# Patient Record
Sex: FEMALE
[De-identification: names, ages, dates, MRNs, and addresses within clinical notes are randomized per-mention and may not be internally consistent; named-entity substitution may affect disease eponyms.]

---

## 2022-05-03 PROBLEM — Z00.00 ENCOUNTER FOR PREVENTIVE HEALTH EXAMINATION: Status: ACTIVE | Noted: 2022-05-03

## 2022-05-04 ENCOUNTER — APPOINTMENT (OUTPATIENT)
Dept: OTOLARYNGOLOGY | Facility: CLINIC | Age: 57
End: 2022-05-04
Payer: COMMERCIAL

## 2022-05-04 VITALS
SYSTOLIC BLOOD PRESSURE: 101 MMHG | BODY MASS INDEX: 24.19 KG/M2 | DIASTOLIC BLOOD PRESSURE: 68 MMHG | HEART RATE: 71 BPM | WEIGHT: 120 LBS | HEIGHT: 59 IN

## 2022-05-04 DIAGNOSIS — R42 DIZZINESS AND GIDDINESS: ICD-10-CM

## 2022-05-04 DIAGNOSIS — Z82.2 FAMILY HISTORY OF DEAFNESS AND HEARING LOSS: ICD-10-CM

## 2022-05-04 DIAGNOSIS — Z78.9 OTHER SPECIFIED HEALTH STATUS: ICD-10-CM

## 2022-05-04 DIAGNOSIS — G43.009 MIGRAINE W/OUT AURA, NOT INTRACTABLE, W/OUT STATUS MIGRAINOSUS: ICD-10-CM

## 2022-05-04 DIAGNOSIS — Z80.9 FAMILY HISTORY OF MALIGNANT NEOPLASM, UNSPECIFIED: ICD-10-CM

## 2022-05-04 DIAGNOSIS — J34.2 DEVIATED NASAL SEPTUM: ICD-10-CM

## 2022-05-04 DIAGNOSIS — J31.0 CHRONIC RHINITIS: ICD-10-CM

## 2022-05-04 DIAGNOSIS — Z85.43 PERSONAL HISTORY OF MALIGNANT NEOPLASM OF OVARY: ICD-10-CM

## 2022-05-04 PROCEDURE — 92557 COMPREHENSIVE HEARING TEST: CPT

## 2022-05-04 PROCEDURE — 92570 ACOUSTIC IMMITANCE TESTING: CPT

## 2022-05-04 PROCEDURE — 92588 EVOKED AUDITORY TST COMPLETE: CPT

## 2022-05-04 PROCEDURE — 99203 OFFICE O/P NEW LOW 30 MIN: CPT

## 2022-05-04 RX ORDER — GINGER ROOT EXTRACT 50 MG
TABLET ORAL
Refills: 0 | Status: ACTIVE | COMMUNITY

## 2022-05-04 NOTE — ASSESSMENT
[FreeTextEntry1] : Reviewed and reconciled medications, allergies, PMHx, PSHx, SocHx, FMHx. \par \par h/o migraine\par vertigo\par \par Audio: type A tymps AU\par ETD AU\par hearing -8000 Hz AU\par right 100% discrim at 40db, left 96% discrim at 40db\par \par Plan:\par Audio - results interpreted by Dr. Dior and reviewed with the patient. VNG. FU after test.

## 2022-05-04 NOTE — REVIEW OF SYSTEMS
[Seasonal Allergies] : seasonal allergies [Vertigo] : vertigo [Lightheadedness] : lightheadedness [Nasal Congestion] : nasal congestion [Dry Eyes] : dry eyes [Negative] : Heme/Lymph [de-identified] : Headache [de-identified] : Feel cooler than others

## 2022-05-04 NOTE — PHYSICAL EXAM
[Midline] : trachea located in midline position [Normal] : no nystagmus [de-identified] : deviated septum midportion to left, along the floor on the right [de-identified] : class 2 [de-identified] : leukoplakia on cheeks. tip of uvula with edema [] : Romberg test is negative [FreeTextEntry2] : Sinuses nontender to percussion.  [de-identified] : PERRL [de-identified] : o [de-identified] : negative head roll- on Dramamine [de-identified] : on Dramamine

## 2022-05-04 NOTE — DATA REVIEWED
[de-identified] : type A tymps AU\par ETD AU\par hearing -8000 Hz AU\par 1) ENT F/U 2) re-eval/further testing as per MD

## 2022-05-04 NOTE — CONSULT LETTER
[Dear  ___] : Dear  [unfilled], [Consult Letter:] : I had the pleasure of evaluating your patient, [unfilled]. [Please see my note below.] : Please see my note below. [Consult Closing:] : Thank you very much for allowing me to participate in the care of this patient.  If you have any questions, please do not hesitate to contact me. [Sincerely,] : Sincerely, [FreeTextEntry3] : Srikanth Dior MD FACS

## 2022-05-04 NOTE — ADDENDUM
[FreeTextEntry1] : Documented by Brown Collins acting as scribe for Dr. Dior on 05/04/2022.\par \par All Medical record entries made by the Scribe were at my, Dr. Dior, direction and personally dictated by me on 05/04/2022. I have reviewed the chart and agree that the record accurately reflects my personal performance of the history, physical exam, assessment and plan. I have also personally directed, reviewed, and agreed with the discharge instructions.

## 2022-05-04 NOTE — HISTORY OF PRESENT ILLNESS
[de-identified] : The patient presents with h/o migraine. Pt presents today for initial consultation for vertigo. Pt details that her symptoms began last year suddenly after waking up with nystagmus. It had been intermittent, when most recently she woke up 5 day ago with vertigo when turning head to the right, which as improved with Dramamine. Pt took Dramamine prior to coming in for today's visit. Denies stop and go type injury, trauma, viral illness preceding symptoms. Denies tinnitus, changes in hearing. Pt does not get the migraine headache with the dizziness. Denies any other symptoms other than headache when migraines occur.

## 2022-07-13 ENCOUNTER — APPOINTMENT (OUTPATIENT)
Dept: OTOLARYNGOLOGY | Facility: CLINIC | Age: 57
End: 2022-07-13

## 2024-09-04 ENCOUNTER — EMERGENCY (EMERGENCY)
Facility: HOSPITAL | Age: 59
LOS: 1 days | Discharge: HOME | End: 2024-09-04
Attending: STUDENT IN AN ORGANIZED HEALTH CARE EDUCATION/TRAINING PROGRAM | Admitting: STUDENT IN AN ORGANIZED HEALTH CARE EDUCATION/TRAINING PROGRAM
Payer: COMMERCIAL

## 2024-09-04 VITALS
HEIGHT: 59 IN | WEIGHT: 113.1 LBS | OXYGEN SATURATION: 97 % | RESPIRATION RATE: 18 BRPM | SYSTOLIC BLOOD PRESSURE: 106 MMHG | DIASTOLIC BLOOD PRESSURE: 67 MMHG | TEMPERATURE: 98 F | HEART RATE: 71 BPM

## 2024-09-04 VITALS
HEART RATE: 60 BPM | SYSTOLIC BLOOD PRESSURE: 106 MMHG | TEMPERATURE: 98 F | OXYGEN SATURATION: 98 % | RESPIRATION RATE: 17 BRPM | DIASTOLIC BLOOD PRESSURE: 71 MMHG

## 2024-09-04 LAB
ALBUMIN SERPL ELPH-MCNC: 4.1 G/DL — SIGNIFICANT CHANGE UP (ref 3.3–5)
ALP SERPL-CCNC: 61 U/L — SIGNIFICANT CHANGE UP (ref 40–120)
ALT FLD-CCNC: 27 U/L — SIGNIFICANT CHANGE UP (ref 12–78)
ANION GAP SERPL CALC-SCNC: 4 MMOL/L — LOW (ref 5–17)
AST SERPL-CCNC: 20 U/L — SIGNIFICANT CHANGE UP (ref 15–37)
BASOPHILS # BLD AUTO: 0.01 K/UL — SIGNIFICANT CHANGE UP (ref 0–0.2)
BASOPHILS NFR BLD AUTO: 0.2 % — SIGNIFICANT CHANGE UP (ref 0–2)
BILIRUB SERPL-MCNC: 0.4 MG/DL — SIGNIFICANT CHANGE UP (ref 0.2–1.2)
BUN SERPL-MCNC: 19 MG/DL — SIGNIFICANT CHANGE UP (ref 7–23)
CALCIUM SERPL-MCNC: 9 MG/DL — SIGNIFICANT CHANGE UP (ref 8.5–10.1)
CHLORIDE SERPL-SCNC: 108 MMOL/L — SIGNIFICANT CHANGE UP (ref 96–108)
CO2 SERPL-SCNC: 30 MMOL/L — SIGNIFICANT CHANGE UP (ref 22–31)
CREAT SERPL-MCNC: 0.83 MG/DL — SIGNIFICANT CHANGE UP (ref 0.5–1.3)
EGFR: 82 ML/MIN/1.73M2 — SIGNIFICANT CHANGE UP
EGFR: 82 ML/MIN/1.73M2 — SIGNIFICANT CHANGE UP
EOSINOPHIL # BLD AUTO: 0.08 K/UL — SIGNIFICANT CHANGE UP (ref 0–0.5)
EOSINOPHIL NFR BLD AUTO: 1.2 % — SIGNIFICANT CHANGE UP (ref 0–6)
GLUCOSE SERPL-MCNC: 105 MG/DL — HIGH (ref 70–99)
HCT VFR BLD CALC: 42.7 % — SIGNIFICANT CHANGE UP (ref 34.5–45)
HGB BLD-MCNC: 14 G/DL — SIGNIFICANT CHANGE UP (ref 11.5–15.5)
IMM GRANULOCYTES NFR BLD AUTO: 0.3 % — SIGNIFICANT CHANGE UP (ref 0–0.9)
LYMPHOCYTES # BLD AUTO: 2.34 K/UL — SIGNIFICANT CHANGE UP (ref 1–3.3)
LYMPHOCYTES # BLD AUTO: 36.1 % — SIGNIFICANT CHANGE UP (ref 13–44)
MCHC RBC-ENTMCNC: 30.9 PG — SIGNIFICANT CHANGE UP (ref 27–34)
MCHC RBC-ENTMCNC: 32.8 GM/DL — SIGNIFICANT CHANGE UP (ref 32–36)
MCV RBC AUTO: 94.3 FL — SIGNIFICANT CHANGE UP (ref 80–100)
MONOCYTES # BLD AUTO: 0.39 K/UL — SIGNIFICANT CHANGE UP (ref 0–0.9)
MONOCYTES NFR BLD AUTO: 6 % — SIGNIFICANT CHANGE UP (ref 2–14)
NEUTROPHILS # BLD AUTO: 3.65 K/UL — SIGNIFICANT CHANGE UP (ref 1.8–7.4)
NEUTROPHILS NFR BLD AUTO: 56.2 % — SIGNIFICANT CHANGE UP (ref 43–77)
NRBC # BLD: 0 /100 WBCS — SIGNIFICANT CHANGE UP (ref 0–0)
NRBC BLD-RTO: 0 /100 WBCS — SIGNIFICANT CHANGE UP (ref 0–0)
PLATELET # BLD AUTO: 216 K/UL — SIGNIFICANT CHANGE UP (ref 150–400)
POTASSIUM SERPL-MCNC: 3.4 MMOL/L — LOW (ref 3.5–5.3)
POTASSIUM SERPL-SCNC: 3.4 MMOL/L — LOW (ref 3.5–5.3)
PROT SERPL-MCNC: 7.2 G/DL — SIGNIFICANT CHANGE UP (ref 6–8.3)
RBC # BLD: 4.53 M/UL — SIGNIFICANT CHANGE UP (ref 3.8–5.2)
RBC # FLD: 12 % — SIGNIFICANT CHANGE UP (ref 10.3–14.5)
SODIUM SERPL-SCNC: 142 MMOL/L — SIGNIFICANT CHANGE UP (ref 135–145)
WBC # BLD: 6.49 K/UL — SIGNIFICANT CHANGE UP (ref 3.8–10.5)
WBC # FLD AUTO: 6.49 K/UL — SIGNIFICANT CHANGE UP (ref 3.8–10.5)

## 2024-09-04 PROCEDURE — 93971 EXTREMITY STUDY: CPT

## 2024-09-04 PROCEDURE — 93971 EXTREMITY STUDY: CPT | Mod: 26,RT

## 2024-09-04 PROCEDURE — 36415 COLL VENOUS BLD VENIPUNCTURE: CPT

## 2024-09-04 PROCEDURE — 99284 EMERGENCY DEPT VISIT MOD MDM: CPT | Mod: 25

## 2024-09-04 PROCEDURE — 85025 COMPLETE CBC W/AUTO DIFF WBC: CPT

## 2024-09-04 PROCEDURE — 80053 COMPREHEN METABOLIC PANEL: CPT

## 2024-09-04 PROCEDURE — 96374 THER/PROPH/DIAG INJ IV PUSH: CPT

## 2024-09-04 PROCEDURE — 99284 EMERGENCY DEPT VISIT MOD MDM: CPT

## 2024-09-04 RX ORDER — ACETAMINOPHEN 500 MG/5ML
1000 LIQUID (ML) ORAL ONCE
Refills: 0 | Status: COMPLETED | OUTPATIENT
Start: 2024-09-04 | End: 2024-09-04

## 2024-09-04 RX ORDER — CEPHALEXIN 250 MG/1
1 CAPSULE ORAL
Qty: 28 | Refills: 0
Start: 2024-09-04 | End: 2024-09-10

## 2024-09-04 RX ORDER — CEPHALEXIN 250 MG/1
500 CAPSULE ORAL ONCE
Refills: 0 | Status: COMPLETED | OUTPATIENT
Start: 2024-09-04 | End: 2024-09-04

## 2024-09-04 RX ADMIN — CEPHALEXIN 500 MILLIGRAM(S): 250 CAPSULE ORAL at 20:45

## 2024-09-04 RX ADMIN — Medication 400 MILLIGRAM(S): at 18:49

## 2024-11-18 ENCOUNTER — INPATIENT (INPATIENT)
Facility: HOSPITAL | Age: 59
LOS: 2 days | Discharge: HOME | DRG: 690 | End: 2024-11-21
Attending: INTERNAL MEDICINE | Admitting: INTERNAL MEDICINE
Payer: COMMERCIAL

## 2024-11-18 VITALS
TEMPERATURE: 100 F | SYSTOLIC BLOOD PRESSURE: 87 MMHG | WEIGHT: 115.08 LBS | RESPIRATION RATE: 18 BRPM | HEART RATE: 111 BPM | OXYGEN SATURATION: 96 % | DIASTOLIC BLOOD PRESSURE: 59 MMHG | HEIGHT: 59 IN

## 2024-11-18 DIAGNOSIS — N39.0 URINARY TRACT INFECTION, SITE NOT SPECIFIED: ICD-10-CM

## 2024-11-18 LAB
ALBUMIN SERPL ELPH-MCNC: 3.3 G/DL — SIGNIFICANT CHANGE UP (ref 3.3–5)
ALP SERPL-CCNC: 81 U/L — SIGNIFICANT CHANGE UP (ref 40–120)
ALT FLD-CCNC: 180 U/L — HIGH (ref 12–78)
ANION GAP SERPL CALC-SCNC: 4 MMOL/L — LOW (ref 5–17)
APPEARANCE UR: ABNORMAL
APTT BLD: 27.3 SEC — SIGNIFICANT CHANGE UP (ref 24.5–35.6)
AST SERPL-CCNC: 145 U/L — HIGH (ref 15–37)
BASOPHILS # BLD AUTO: 0 K/UL — SIGNIFICANT CHANGE UP (ref 0–0.2)
BASOPHILS NFR BLD AUTO: 0 % — SIGNIFICANT CHANGE UP (ref 0–2)
BILIRUB SERPL-MCNC: 0.6 MG/DL — SIGNIFICANT CHANGE UP (ref 0.2–1.2)
BILIRUB UR-MCNC: NEGATIVE — SIGNIFICANT CHANGE UP
BUN SERPL-MCNC: 22 MG/DL — SIGNIFICANT CHANGE UP (ref 7–23)
CALCIUM SERPL-MCNC: 8.8 MG/DL — SIGNIFICANT CHANGE UP (ref 8.5–10.1)
CHLORIDE SERPL-SCNC: 101 MMOL/L — SIGNIFICANT CHANGE UP (ref 96–108)
CO2 SERPL-SCNC: 26 MMOL/L — SIGNIFICANT CHANGE UP (ref 22–31)
COLOR SPEC: YELLOW — SIGNIFICANT CHANGE UP
CREAT SERPL-MCNC: 1.5 MG/DL — HIGH (ref 0.5–1.3)
DIFF PNL FLD: ABNORMAL
EGFR: 40 ML/MIN/1.73M2 — LOW
EOSINOPHIL # BLD AUTO: 0.07 K/UL — SIGNIFICANT CHANGE UP (ref 0–0.5)
EOSINOPHIL NFR BLD AUTO: 1 % — SIGNIFICANT CHANGE UP (ref 0–6)
GLUCOSE SERPL-MCNC: 154 MG/DL — HIGH (ref 70–99)
GLUCOSE UR QL: NEGATIVE MG/DL — SIGNIFICANT CHANGE UP
HCT VFR BLD CALC: 39.1 % — SIGNIFICANT CHANGE UP (ref 34.5–45)
HGB BLD-MCNC: 13.5 G/DL — SIGNIFICANT CHANGE UP (ref 11.5–15.5)
INR BLD: 1.09 RATIO — SIGNIFICANT CHANGE UP (ref 0.85–1.16)
KETONES UR-MCNC: ABNORMAL MG/DL
LACTATE SERPL-SCNC: 2.5 MMOL/L — HIGH (ref 0.7–2)
LEUKOCYTE ESTERASE UR-ACNC: ABNORMAL
LYMPHOCYTES # BLD AUTO: 0.2 K/UL — LOW (ref 1–3.3)
LYMPHOCYTES # BLD AUTO: 3 % — LOW (ref 13–44)
MCHC RBC-ENTMCNC: 31.8 PG — SIGNIFICANT CHANGE UP (ref 27–34)
MCHC RBC-ENTMCNC: 34.5 G/DL — SIGNIFICANT CHANGE UP (ref 32–36)
MCV RBC AUTO: 92.2 FL — SIGNIFICANT CHANGE UP (ref 80–100)
MONOCYTES # BLD AUTO: 0 K/UL — SIGNIFICANT CHANGE UP (ref 0–0.9)
MONOCYTES NFR BLD AUTO: 0 % — LOW (ref 2–14)
NEUTROPHILS # BLD AUTO: 6.54 K/UL — SIGNIFICANT CHANGE UP (ref 1.8–7.4)
NEUTROPHILS NFR BLD AUTO: 86 % — HIGH (ref 43–77)
NITRITE UR-MCNC: NEGATIVE — SIGNIFICANT CHANGE UP
NRBC # BLD: SIGNIFICANT CHANGE UP /100 WBCS (ref 0–0)
PH UR: 6 — SIGNIFICANT CHANGE UP (ref 5–8)
PLATELET # BLD AUTO: 109 K/UL — LOW (ref 150–400)
POTASSIUM SERPL-MCNC: 5.1 MMOL/L — SIGNIFICANT CHANGE UP (ref 3.5–5.3)
POTASSIUM SERPL-SCNC: 5.1 MMOL/L — SIGNIFICANT CHANGE UP (ref 3.5–5.3)
PROT SERPL-MCNC: 7.3 G/DL — SIGNIFICANT CHANGE UP (ref 6–8.3)
PROT UR-MCNC: 30 MG/DL
PROTHROM AB SERPL-ACNC: 12.9 SEC — SIGNIFICANT CHANGE UP (ref 9.9–13.4)
RAPID RVP RESULT: SIGNIFICANT CHANGE UP
RBC # BLD: 4.24 M/UL — SIGNIFICANT CHANGE UP (ref 3.8–5.2)
RBC # FLD: 12.2 % — SIGNIFICANT CHANGE UP (ref 10.3–14.5)
SARS-COV-2 RNA SPEC QL NAA+PROBE: SIGNIFICANT CHANGE UP
SODIUM SERPL-SCNC: 131 MMOL/L — LOW (ref 135–145)
SP GR SPEC: 1.02 — SIGNIFICANT CHANGE UP (ref 1–1.03)
UROBILINOGEN FLD QL: 1 MG/DL — SIGNIFICANT CHANGE UP (ref 0.2–1)
WBC # BLD: 6.81 K/UL — SIGNIFICANT CHANGE UP (ref 3.8–10.5)
WBC # FLD AUTO: 6.81 K/UL — SIGNIFICANT CHANGE UP (ref 3.8–10.5)

## 2024-11-18 PROCEDURE — 99285 EMERGENCY DEPT VISIT HI MDM: CPT

## 2024-11-18 PROCEDURE — 71045 X-RAY EXAM CHEST 1 VIEW: CPT | Mod: 26

## 2024-11-18 PROCEDURE — 74176 CT ABD & PELVIS W/O CONTRAST: CPT | Mod: 26,MC

## 2024-11-18 PROCEDURE — 93010 ELECTROCARDIOGRAM REPORT: CPT

## 2024-11-18 RX ORDER — CEFTRIAXONE SODIUM 1 G
1000 VIAL (EA) INJECTION EVERY 24 HOURS
Refills: 0 | Status: DISCONTINUED | OUTPATIENT
Start: 2024-11-18 | End: 2024-11-20

## 2024-11-18 RX ORDER — DIPHENHYDRAMINE HCL 25 MG
25 CAPSULE ORAL EVERY 6 HOURS
Refills: 0 | Status: DISCONTINUED | OUTPATIENT
Start: 2024-11-18 | End: 2024-11-20

## 2024-11-18 RX ORDER — CEFTRIAXONE SODIUM 1 G
1000 VIAL (EA) INJECTION ONCE
Refills: 0 | Status: COMPLETED | OUTPATIENT
Start: 2024-11-18 | End: 2024-11-18

## 2024-11-18 RX ORDER — ACETAMINOPHEN 500MG 500 MG/1
650 TABLET, COATED ORAL EVERY 6 HOURS
Refills: 0 | Status: DISCONTINUED | OUTPATIENT
Start: 2024-11-18 | End: 2024-11-21

## 2024-11-18 RX ORDER — FAMOTIDINE 20 MG/1
20 TABLET, FILM COATED ORAL
Refills: 0 | Status: DISCONTINUED | OUTPATIENT
Start: 2024-11-18 | End: 2024-11-18

## 2024-11-18 RX ORDER — METHYLPREDNISOLONE SOD SUCC 125 MG
125 VIAL (EA) INJECTION ONCE
Refills: 0 | Status: COMPLETED | OUTPATIENT
Start: 2024-11-18 | End: 2024-11-18

## 2024-11-18 RX ORDER — EPINEPHRINE 1 MG/ML(1)
0.3 AMPUL (ML) INJECTION ONCE
Refills: 0 | Status: COMPLETED | OUTPATIENT
Start: 2024-11-18 | End: 2024-11-18

## 2024-11-18 RX ORDER — FAMOTIDINE 20 MG/1
20 TABLET, FILM COATED ORAL ONCE
Refills: 0 | Status: COMPLETED | OUTPATIENT
Start: 2024-11-18 | End: 2024-11-18

## 2024-11-18 RX ORDER — DIPHENHYDRAMINE HCL 25 MG
25 CAPSULE ORAL ONCE
Refills: 0 | Status: COMPLETED | OUTPATIENT
Start: 2024-11-18 | End: 2024-11-18

## 2024-11-18 RX ORDER — ACETAMINOPHEN 500MG 500 MG/1
1000 TABLET, COATED ORAL ONCE
Refills: 0 | Status: COMPLETED | OUTPATIENT
Start: 2024-11-18 | End: 2024-11-18

## 2024-11-18 RX ORDER — FAMOTIDINE 20 MG/1
20 TABLET, FILM COATED ORAL DAILY
Refills: 0 | Status: DISCONTINUED | OUTPATIENT
Start: 2024-11-19 | End: 2024-11-21

## 2024-11-18 RX ORDER — ONDANSETRON HYDROCHLORIDE 4 MG/1
4 TABLET, FILM COATED ORAL EVERY 4 HOURS
Refills: 0 | Status: DISCONTINUED | OUTPATIENT
Start: 2024-11-18 | End: 2024-11-21

## 2024-11-18 RX ORDER — SODIUM CHLORIDE 9 MG/ML
1000 INJECTION, SOLUTION INTRAMUSCULAR; INTRAVENOUS; SUBCUTANEOUS
Refills: 0 | Status: DISCONTINUED | OUTPATIENT
Start: 2024-11-18 | End: 2024-11-19

## 2024-11-18 RX ORDER — SODIUM CHLORIDE 9 MG/ML
1600 INJECTION, SOLUTION INTRAMUSCULAR; INTRAVENOUS; SUBCUTANEOUS ONCE
Refills: 0 | Status: COMPLETED | OUTPATIENT
Start: 2024-11-18 | End: 2024-11-18

## 2024-11-18 RX ADMIN — ACETAMINOPHEN 500MG 400 MILLIGRAM(S): 500 TABLET, COATED ORAL at 20:29

## 2024-11-18 RX ADMIN — Medication 0.3 MILLIGRAM(S): at 21:05

## 2024-11-18 RX ADMIN — Medication 100 MILLIGRAM(S): at 20:36

## 2024-11-18 RX ADMIN — Medication 25 MILLIGRAM(S): at 20:22

## 2024-11-18 RX ADMIN — ACETAMINOPHEN 500MG 1000 MILLIGRAM(S): 500 TABLET, COATED ORAL at 21:15

## 2024-11-18 RX ADMIN — FAMOTIDINE 20 MILLIGRAM(S): 20 TABLET, FILM COATED ORAL at 20:21

## 2024-11-18 RX ADMIN — Medication 125 MILLIGRAM(S): at 20:21

## 2024-11-18 RX ADMIN — SODIUM CHLORIDE 1600 MILLILITER(S): 9 INJECTION, SOLUTION INTRAMUSCULAR; INTRAVENOUS; SUBCUTANEOUS at 20:22

## 2024-11-18 NOTE — H&P ADULT - PROBLEM SELECTOR PLAN 1
pt presents with acute sepsis  fu all cultures  urine may be source   she does state she had episode of diarrhea after eating outside food  gi pcr stool  continue rocephin  bacid  id and gi eval  ivf  trend labs, lactate

## 2024-11-18 NOTE — ED ADULT NURSE NOTE - TEMPLATE LIST FOR HEAD TO TOE ASSESSMENT
ANTICOAGULATION THERAPY EDUCATION   PATIENT  INSTRUCTION    Your Guide to Using Warfarin:  Please refer to this handout for questions regarding your medication, Coumadin/Warfarin. This handout includes information on dosing, blood testing, possible side effects of Coumadin/Warfarin, using other medications, dietary guidelines and safety concerns while on anticoagulation therapy.    Please contact your primary care physician and/or seek appropriate medical care if you experience:  · A serious fall  · Increased menstrual bleeding   · An injury to your head  · Notice a different color urine or stool   · Increased bleeding with teeth brushing   · If you have any other unusual bruising   · Have bleeding from your nose or a cut that doesn't stop bleeding         Call your physician immediately if you notice any signs and symptoms of a blood clot such as:  · Sudden weakness in any limb  · Dizziness or faintness   · Numbness or tingling anywhere  · New shortness of breath or chest pain   · Sudden onset of slurred speech or inability to speak  · New pain, swelling, redness or heat in any extremity   · Visual changes or loss of sight in either eye         Other factors that may affect your anticoagulation therapy include:  · Fever  · Stress   · Diarrhea  · Changes in exercise/activity level   · Vomiting         While on Anticoagulation therapy avoid:  · Pregnancy, using birth control and hormone replacement therapy    · Body piercing or tattoos      Please inform family members and other health care providers that you are on anticoagulation therapy. Make sure to carry an up-to-date medication list. You can also wear a medical alert bracelet to identify that you are on anticoagulation therapy.    If you are utilizing an injectable anticoagulation medication you should be aware of how and where the medication should be injected and how to appropriately dispose of the injection needles (sharps).    Additional patient  education materials provided to you:  · Important Facts about your Coumadin (color handout)  · Vitamin K Food List  · Travel Fitness Tips for Patients on Coumadin  · Prevention and Treatment of Nosebleeds  · When To Call Your Physician  · Potential and Documented Interaction of Herbs with Coumadin/Warfarin  · Lab hours for [facility]   Allergic RX

## 2024-11-18 NOTE — ED PROVIDER NOTE - CLINICAL SUMMARY MEDICAL DECISION MAKING FREE TEXT BOX
patient is a 59-year-old female with history of migraines presents emergency department for   Rash and UTI. patient over the last 2 weeks has been having abdominal pain, was found to have UTI.  Was initially started on antibiotics is not sure, then changed to Bactrim.  Has been on for about 7 days.  Went to urgent care again today, was found to have high heart rate and low blood pressure in the 80s.  Patient been having rash since yesterday.  Denies difficulty breathing, lip swelling, tongue swelling.  Has been having nausea without vomiting.    General: well appearing, no acute distress, appropriate weight  HENT:  Head: normocephalic, atraumatic.   Ears: canal clear, TM intact with good light reflex  Eyes: EOMI, PERRLA, no nystagmus  Nose: atraumatic  Oropharynx: mucosa pink, moist, no lesions, uvula midline. no tongue or lip swelling.   Neck: no lymphadenopathy    Cardiac: heart tachy no murmurs, rubs, gallops, pulses 2+ x4  Pulm: lungs CTA  GI: abdomen soft, nontender, negative blanchard's, McBurney's, rovsings, rebound, CVA tenderness  Skin: warm, dry, no laceration, abrasion, contusion. blacnhing erythematous raised rash throughout.      Given patient's low blood pressure and unclear source whether sepsis or anaphylaxis, given epinephrine as well as fluids and antibiotics.

## 2024-11-18 NOTE — ED ADULT NURSE NOTE - CHPI ED NUR SYMPTOMS NEG
no difficulty breathing/no difficulty swallowing/no shortness of breath/no swelling of face, tongue/no wheezing

## 2024-11-18 NOTE — ED ADULT NURSE NOTE - OBJECTIVE STATEMENT
59 y.o female presents to ED with possible allergic reaction 59 y.o female presents to ED with allergic reaction.  Pt reports she has been taking Bactrim for UTI for the past week.  Pt yesterday developed diffuse rash without chest pain shortness of breath or trouble breathing.  Pt went to urgent care today who noted her to have a fever high heart rate and low blood pressure. Pt notes since Saturday she has had a fever treated with DayQuil NyQuil and ibuprofen.  Patient reports associated vomiting and back pain.  Pt reports urinary symptoms have improved.  Patient denies cough shortness of breath chest pain abdominal pain diarrhea

## 2024-11-18 NOTE — ED PROVIDER NOTE - DIFFERENTIAL DIAGNOSIS
DDx includes but not limited to: Sepsis VS anaphylaxis VS UTI VS pyelonephritis VS kidney stone Differential Diagnosis

## 2024-11-18 NOTE — H&P ADULT - PROBLEM SELECTOR PLAN 2
may be due to bactrim- sulfa  hold all sulfa meds  prn benadryl  ivf  given steroids in er with improvement  diffuse rash - moniter clinical resolution

## 2024-11-18 NOTE — ED ADULT TRIAGE NOTE - CHIEF COMPLAINT QUOTE
I'm being treated for UTI since last Monday and yesterday started to have hives and facial swelling w/ fever. T Max 103.4, took Tylenol and Advil at home. Also c/o mid abd pain radiating to back.

## 2024-11-18 NOTE — ED PROVIDER NOTE - OBJECTIVE STATEMENT
Patient is a 59-year-old female with past medical history of migraines presents with allergic reaction.  Patient reports she has been taking Bactrim for UTI for the past week.  Patient yesterday developed diffuse rash without chest pain shortness of breath or trouble breathing.  Patient went to urgent care today who noted her to have a fever high heart rate and low blood pressure.  Patient was sent to ED for evaluation.  Patient notes since Saturday she has had a fever treated with DayQuil NyQuil and ibuprofen.  Patient reports associated vomiting and back pain.  Patient reports urinary symptoms have improved.  Patient denies cough shortness of breath chest pain abdominal pain diarrhea

## 2024-11-18 NOTE — ED PROVIDER NOTE - MDM ORDERS SUBMITTED SELECTION
Imaging Studies/Medications

## 2024-11-19 DIAGNOSIS — N30.00 ACUTE CYSTITIS WITHOUT HEMATURIA: ICD-10-CM

## 2024-11-19 DIAGNOSIS — R79.89 OTHER SPECIFIED ABNORMAL FINDINGS OF BLOOD CHEMISTRY: ICD-10-CM

## 2024-11-19 DIAGNOSIS — T78.40XA ALLERGY, UNSPECIFIED, INITIAL ENCOUNTER: ICD-10-CM

## 2024-11-19 DIAGNOSIS — N17.9 ACUTE KIDNEY FAILURE, UNSPECIFIED: ICD-10-CM

## 2024-11-19 DIAGNOSIS — A41.9 SEPSIS, UNSPECIFIED ORGANISM: ICD-10-CM

## 2024-11-19 LAB
ALBUMIN SERPL ELPH-MCNC: 2.7 G/DL — LOW (ref 3.3–5)
ALP SERPL-CCNC: 71 U/L — SIGNIFICANT CHANGE UP (ref 40–120)
ALT FLD-CCNC: 140 U/L — HIGH (ref 12–78)
ANION GAP SERPL CALC-SCNC: 5 MMOL/L — SIGNIFICANT CHANGE UP (ref 5–17)
AST SERPL-CCNC: 68 U/L — HIGH (ref 15–37)
BILIRUB SERPL-MCNC: 0.3 MG/DL — SIGNIFICANT CHANGE UP (ref 0.2–1.2)
BUN SERPL-MCNC: 14 MG/DL — SIGNIFICANT CHANGE UP (ref 7–23)
CALCIUM SERPL-MCNC: 8 MG/DL — LOW (ref 8.5–10.1)
CHLORIDE SERPL-SCNC: 108 MMOL/L — SIGNIFICANT CHANGE UP (ref 96–108)
CO2 SERPL-SCNC: 26 MMOL/L — SIGNIFICANT CHANGE UP (ref 22–31)
CREAT SERPL-MCNC: 0.89 MG/DL — SIGNIFICANT CHANGE UP (ref 0.5–1.3)
EGFR: 75 ML/MIN/1.73M2 — SIGNIFICANT CHANGE UP
GLUCOSE SERPL-MCNC: 149 MG/DL — HIGH (ref 70–99)
HCT VFR BLD CALC: 33.7 % — LOW (ref 34.5–45)
HGB BLD-MCNC: 11.2 G/DL — LOW (ref 11.5–15.5)
LACTATE SERPL-SCNC: 2 MMOL/L — SIGNIFICANT CHANGE UP (ref 0.7–2)
MCHC RBC-ENTMCNC: 31.5 PG — SIGNIFICANT CHANGE UP (ref 27–34)
MCHC RBC-ENTMCNC: 33.2 G/DL — SIGNIFICANT CHANGE UP (ref 32–36)
MCV RBC AUTO: 94.7 FL — SIGNIFICANT CHANGE UP (ref 80–100)
NRBC # BLD: 0 /100 WBCS — SIGNIFICANT CHANGE UP (ref 0–0)
PLATELET # BLD AUTO: 90 K/UL — LOW (ref 150–400)
POTASSIUM SERPL-MCNC: 4.1 MMOL/L — SIGNIFICANT CHANGE UP (ref 3.5–5.3)
POTASSIUM SERPL-SCNC: 4.1 MMOL/L — SIGNIFICANT CHANGE UP (ref 3.5–5.3)
PROCALCITONIN SERPL-MCNC: 0.71 NG/ML — HIGH
PROT SERPL-MCNC: 5.8 G/DL — LOW (ref 6–8.3)
RBC # BLD: 3.56 M/UL — LOW (ref 3.8–5.2)
RBC # FLD: 11.9 % — SIGNIFICANT CHANGE UP (ref 10.3–14.5)
SODIUM SERPL-SCNC: 139 MMOL/L — SIGNIFICANT CHANGE UP (ref 135–145)
WBC # BLD: 4.49 K/UL — SIGNIFICANT CHANGE UP (ref 3.8–10.5)
WBC # FLD AUTO: 4.49 K/UL — SIGNIFICANT CHANGE UP (ref 3.8–10.5)

## 2024-11-19 PROCEDURE — 99222 1ST HOSP IP/OBS MODERATE 55: CPT

## 2024-11-19 RX ORDER — DIPHENHYDRAMINE HCL 25 MG
25 CAPSULE ORAL ONCE
Refills: 0 | Status: COMPLETED | OUTPATIENT
Start: 2024-11-19 | End: 2024-11-20

## 2024-11-19 RX ORDER — SODIUM CHLORIDE 9 MG/ML
1000 INJECTION, SOLUTION INTRAMUSCULAR; INTRAVENOUS; SUBCUTANEOUS
Refills: 0 | Status: DISCONTINUED | OUTPATIENT
Start: 2024-11-19 | End: 2024-11-20

## 2024-11-19 RX ORDER — DIPHENHYDRAMINE HCL 25 MG
25 CAPSULE ORAL ONCE
Refills: 0 | Status: DISCONTINUED | OUTPATIENT
Start: 2024-11-19 | End: 2024-11-19

## 2024-11-19 RX ADMIN — FAMOTIDINE 20 MILLIGRAM(S): 20 TABLET, FILM COATED ORAL at 11:59

## 2024-11-19 RX ADMIN — ACETAMINOPHEN 500MG 650 MILLIGRAM(S): 500 TABLET, COATED ORAL at 09:48

## 2024-11-19 RX ADMIN — Medication 25 MILLIGRAM(S): at 01:42

## 2024-11-19 RX ADMIN — Medication 25 MILLIGRAM(S): at 19:43

## 2024-11-19 RX ADMIN — Medication 25 MILLIGRAM(S): at 12:28

## 2024-11-19 RX ADMIN — SODIUM CHLORIDE 100 MILLILITER(S): 9 INJECTION, SOLUTION INTRAMUSCULAR; INTRAVENOUS; SUBCUTANEOUS at 01:00

## 2024-11-19 RX ADMIN — SODIUM CHLORIDE 75 MILLILITER(S): 9 INJECTION, SOLUTION INTRAMUSCULAR; INTRAVENOUS; SUBCUTANEOUS at 12:29

## 2024-11-19 RX ADMIN — Medication 100 MILLIGRAM(S): at 21:50

## 2024-11-19 RX ADMIN — ACETAMINOPHEN 500MG 650 MILLIGRAM(S): 500 TABLET, COATED ORAL at 10:48

## 2024-11-19 NOTE — PATIENT PROFILE ADULT - FALL HARM RISK - UNIVERSAL INTERVENTIONS
Bed in lowest position, wheels locked, appropriate side rails in place/Call bell, personal items and telephone in reach/Instruct patient to call for assistance before getting out of bed or chair/Non-slip footwear when patient is out of bed/Kell to call system/Physically safe environment - no spills, clutter or unnecessary equipment/Purposeful Proactive Rounding/Room/bathroom lighting operational, light cord in reach

## 2024-11-19 NOTE — CONSULT NOTE ADULT - SUBJECTIVE AND OBJECTIVE BOX
[facility]    Jorge A Chico Fuller NP    [address] [place] [address]  [phone]      Chief Complaint:  Patient is a 59y old  Female who presents with a chief complaint of anaphylasis and sepsis (2024 11:36)      HPI: Patient is a 59-year-old female with past medical history of migraines presents with allergic reaction.  Patient reports she has been taking Bactrim for UTI for the past week.  Patient yesterday developed diffuse rash without chest pain shortness of breath or trouble breathing.  Patient went to urgent care today who noted her to have a fever high heart rate and low blood pressure.  Patient was sent to ED for evaluation.  Patient notes since Saturday she has had a fever treated with DayQuil NyQuil and ibuprofen.  Patient reports associated vomiting and back pain.  Patient reports urinary symptoms have improved.  Patient denies cough shortness of breath chest pain abdominal pain diarrhea    Allergies:  Bactrim (Rash)  codeine (Vomiting; Nausea)      Medications:  acetaminophen     Tablet .. 650 milliGRAM(s) Oral every 6 hours PRN  cefTRIAXone   IVPB 1000 milliGRAM(s) IV Intermittent every 24 hours  diphenhydrAMINE 25 milliGRAM(s) Oral every 6 hours PRN  famotidine    Tablet 20 milliGRAM(s) Oral daily  ondansetron Injectable 4 milliGRAM(s) IV Push every 4 hours PRN  sodium chloride 0.9%. 1000 milliLiter(s) IV Continuous <Continuous>      PMHX/PSHX:  Migraines    No significant past surgical history        Family history:      Social History:     ROS:     General:  No wt loss, fevers, chills, night sweats, fatigue,   Eyes:  Good vision, no reported pain  ENT:  No sore throat, pain, runny nose, dysphagia  CV:  No pain, palpitations, hypo/hypertension  Resp:  No dyspnea, cough, tachypnea, wheezing  GI:  No pain, No nausea, No vomiting, No diarrhea, No constipation, No weight loss, No fever, No pruritis, No rectal bleeding, No tarry stools, No dysphagia,  :  No pain, bleeding, incontinence, nocturia  Muscle:  No pain, weakness  Neuro:  No weakness, tingling, memory problems  Psych:  No fatigue, insomnia, mood problems, depression  Endocrine:  No polyuria, polydipsia, cold/heat intolerance  Heme:  No petechiae, ecchymosis, easy bruisability  Skin:  No rash, tattoos, scars, edema      PHYSICAL EXAM:   Vital Signs:  Vital Signs Last 24 Hrs  T(C): 37.6 (2024 11:07), Max: 38.1 (2024 19:30)  T(F): 99.7 (2024 11:07), Max: 100.5 (2024 19:30)  HR: 91 (2024 11:07) (68 - 111)  BP: 91/52 (2024 11:07) (87/59 - 104/57)  BP(mean): --  RR: 17 (2024 11:07) (17 - 18)  SpO2: 94% (:07) (94% - 100%)    Parameters below as of 2024 11:07  Patient On (Oxygen Delivery Method): room air      Daily Height in cm: 149.86 (2024 19:17)    Daily Weight in k.2 (2024 01:29)    GENERAL:  Appears stated age, well-groomed, well-nourished, no distress  HEENT:  NC/AT,  conjunctivae clear and pink, no thyromegaly, nodules, adenopathy, no JVD, sclera -anicteric  CHEST:  Full & symmetric excursion, no increased effort, breath sounds clear  HEART:  Regular rhythm, S1, S2, no murmur/rub/S3/S4, no abdominal bruit, no edema  ABDOMEN:  Soft, non-tender, non-distended, normoactive bowel sounds,  no masses ,no hepato-splenomegaly, no signs of chronic liver disease  EXTEREMITIES:  no cyanosis,clubbing or edema  SKIN:  No rash/erythema/ecchymoses/petechiae/wounds/abscess/warm/dry  NEURO:  Alert, oriented, no asterixis, no tremor, no encephalopathy    LABS:                        11.2   4.49  )-----------( 90       ( 2024 06:50 )             33.7         139  |  108  |  14  ----------------------------<  149[H]  4.1   |  26  |  0.89    Ca    8.0[L]      2024 06:50    TPro  5.8[L]  /  Alb  2.7[L]  /  TBili  0.3  /  DBili  x   /  AST  68[H]  /  ALT  140[H]  /  AlkPhos  71      LIVER FUNCTIONS - ( 2024 06:50 )  Alb: 2.7 g/dL / Pro: 5.8 g/dL / ALK PHOS: 71 U/L / ALT: 140 U/L / AST: 68 U/L / GGT: x           PT/INR - ( 2024 20:05 )   PT: 12.9 sec;   INR: 1.09 ratio         PTT - ( 2024 20:05 )  PTT:27.3 sec  Urinalysis Basic - ( 2024 06:50 )    Color: x / Appearance: x / SG: x / pH: x  Gluc: 149 mg/dL / Ketone: x  / Bili: x / Urobili: x   Blood: x / Protein: x / Nitrite: x   Leuk Esterase: x / RBC: x / WBC x   Sq Epi: x / Non Sq Epi: x / Bacteria: x          Imaging:              
[facility]  INFECTIOUS DISEASES - [address], [place] [address]  Tel: [phone]     Fax: [phone]  =======================================================    [id number]  MARK PRIETO     CC: Patient is a 59y old  Female who presents with a chief complaint of fever, rash    HPI:  59-year-old female with past medical history of migraines, UTI's, who presents with fevers and rash. Patient says she developed urinary frequency and was started on ciprofloxacin about 2 weeks ago. Urinary symptoms recurred after completing 7 days of cipro, and she was prescribed Bactrim by her GYN which she started on 11/12. On 11/16 AM, she developed nausea and fever. On 11/17,  noted that she had a diffuse rash.     Patient reports feeling better today. She has had some lower abdominal and lower back discomfort which she has had since she developed urinary symptoms. She denies any itching or SOB. She denies any sore throat, oral or other mucosal ulcers. She is ambulating without difficulty. She denies any sick contacts or recent travel, She has hx of UTI's in the past and has seen both GYN and Urology (had cystoscopy earlier this year). She does not recall if she had taken Bactrim in the past.        PAST MEDICAL & SURGICAL HISTORY:  Migraines      No significant past surgical history          Social Hx:     FAMILY HISTORY:      Allergies    Bactrim (Rash)  codeine (Vomiting; Nausea)    Intolerances        Antibiotics:  MEDICATIONS  (STANDING):  cefTRIAXone   IVPB 1000 milliGRAM(s) IV Intermittent every 24 hours  famotidine    Tablet 20 milliGRAM(s) Oral daily  sodium chloride 0.9%. 1000 milliLiter(s) (75 mL/Hr) IV Continuous <Continuous>    MEDICATIONS  (PRN):  acetaminophen     Tablet .. 650 milliGRAM(s) Oral every 6 hours PRN Moderate Pain (4 - 6)  diphenhydrAMINE 25 milliGRAM(s) Oral every 6 hours PRN Rash and/or Itching  ondansetron Injectable 4 milliGRAM(s) IV Push every 4 hours PRN Nausea and/or Vomiting       REVIEW OF SYSTEMS:  CONSTITUTIONAL: + fevers  HEENT:  No sore throat or runny nose.  CARDIOVASCULAR:  No chest pain or SOB.  RESPIRATORY:  No cough, shortness of breath  GASTROINTESTINAL:  No  vomiting or diarrhea. no abdominal pain  GENITOURINARY:  No dysuria  MUSCULOSKELETAL:  no joint aches/swelling  SKIN:  + rash  NEUROLOGIC:  + headache with fever--improved. no dizziness  PSYCHIATRIC:  No disorder of thought or mood.    Physical Exam:  Vital Signs Last 24 Hrs  T(C): 37.6 (19 Nov 2024 11:07), Max: 38.1 (18 Nov 2024 19:30)  T(F): 99.7 (19 Nov 2024 11:07), Max: 100.5 (18 Nov 2024 19:30)  HR: 91 (19 Nov 2024 11:07) (68 - 111)  BP: 91/52 (19 Nov 2024 11:07) (87/59 - 104/57)  BP(mean): --  RR: 17 (19 Nov 2024 11:07) (17 - 18)  SpO2: 94% (19 Nov 2024 11:07) (94% - 100%)    Parameters below as of 19 Nov 2024 11:07  Patient On (Oxygen Delivery Method): room air      Height (cm): 149.9 (11-18 @ 19:17)  Weight (kg): 52.2 (11-18 @ 19:17)  BMI (kg/m2): 23.2 (11-18 @ 19:17)  BSA (m2): 1.46 (11-18 @ 19:17)  GEN: NAD  HEENT: normocephalic and atraumatic.   NECK: Supple.   LUNGS: Normal respiratory effort  HEART: Regular rate and rhythm   ABDOMEN: Soft, nontender, and nondistended.    : No CVA tenderness  EXTREMITIES: No leg edema.  NEUROLOGIC: grossly intact.  PSYCHIATRIC: Appropriate affect .  SKIN: diffuse erythematous maculopapular rash on back, chest/abdomen, and extremities, no oral ulcers seen    Labs:  11-19    139  |  108  |  14  ----------------------------<  149[H]  4.1   |  26  |  0.89    Ca    8.0[L]      19 Nov 2024 06:50    TPro  5.8[L]  /  Alb  2.7[L]  /  TBili  0.3  /  DBili  x   /  AST  68[H]  /  ALT  140[H]  /  AlkPhos  71  11-19                          11.2   4.49  )-----------( 90       ( 19 Nov 2024 06:50 )             33.7     PT/INR - ( 18 Nov 2024 20:05 )   PT: 12.9 sec;   INR: 1.09 ratio         PTT - ( 18 Nov 2024 20:05 )  PTT:27.3 sec  Urinalysis Basic - ( 19 Nov 2024 06:50 )    Color: x / Appearance: x / SG: x / pH: x  Gluc: 149 mg/dL / Ketone: x  / Bili: x / Urobili: x   Blood: x / Protein: x / Nitrite: x   Leuk Esterase: x / RBC: x / WBC x   Sq Epi: x / Non Sq Epi: x / Bacteria: x      LIVER FUNCTIONS - ( 19 Nov 2024 06:50 )  Alb: 2.7 g/dL / Pro: 5.8 g/dL / ALK PHOS: 71 U/L / ALT: 140 U/L / AST: 68 U/L / GGT: x                       SARS-CoV-2: NotDetec (11-18-24 @ 20:25)      RECENT CULTURES:  11-18 @ 20:25          NotDetec        All imaging and other data have been reviewed.    < from: CT Renal Stone Hunt (11.18.24 @ 20:50) >  FINDINGS:    Evaluation of the solid organs and vasculature is limited in the absence   of intravenous contrast.  LOWER CHEST: Within normal limits.    LIVER: Within normal limits.  BILE DUCTS: Normal caliber.  GALLBLADDER: Cholecystectomy.  SPLEEN: Within normal limits.  PANCREAS: Within normal limits.  ADRENALS: Within normal limits.  KIDNEYS/URETERS: No renal calculi orhydronephrosis.    BLADDER: Within normal limits.  REPRODUCTIVE ORGANS: Uterus and adnexa are within normal limits.    BOWEL: No bowel obstruction. Appendix is not visualized. No evidence of   inflammation in the pericecal region.  PERITONEUM/RETROPERITONEUM: Within normal limits. Retroperitoneal   surgical clips.  VESSELS: Within normal limits.  LYMPH NODES: No lymphadenopathy.  ABDOMINAL WALL: Within normal limits.  BONES: Degenerative changes.    IMPRESSION:  No obstructive uropathy.        --- End of Report ---    < end of copied text >

## 2024-11-19 NOTE — CHART NOTE - NSCHARTNOTEFT_GEN_A_CORE
Patient received last dose of PO Benadryl at 7 pm but complains of persistent rashes and itching  Will give IV Benadryl 25 mg for further relief

## 2024-11-19 NOTE — CARE COORDINATION ASSESSMENT. - NSDCPLANSERVICES_GEN_ALL_CORE
Met with patient by bedside is  alert and oriented. She reports living with spouse and young adult daughters. She is fully independent. She drives , meal preps and uses no medical equipment. She was referred for SBIRT. She reports two single drinks a week. She has no concerns and reports no one has verbalized concerns to her. Provided positive reinforcement. No identifies Social work or Case Management needs at this time. Social Work to remain available for any hospital transition needs./No Anticipated Discharge Needs

## 2024-11-19 NOTE — CARE COORDINATION ASSESSMENT. - NSCAREPROVIDERS_GEN_ALL_CORE_FT
CARE PROVIDERS:  Accepting Physician: Kary Vick  Access Services: Alexis Gordon  Administration: Rakesh Turpin  Administration: Gwendolyn Conway  Admitting: Kary Vick  Attending: Kary Vick  Case Management: Johan Tanner  Consultant: Luciano Rojas  Consultant: Allison Alonso  Consultant: Gloria Andrade  ED ACP: Latesha Souza ED Attending: Ry Barragan ED Nurse: Yanet Alcala  Nurse: Sybil Camargo  Ordered: Doctor, Unknown  Outpatient Provider: Kary Vick  Override: Dilma Pineda  Override: John Shaw  Override: Babita De Jesus  Override: Jim Barraza  Override: Tomasa Mccabe  PCA/Nursing Assistant: Antoinette Zaman  Registered Dietitian: Halle Cade  : Jannette Walter

## 2024-11-19 NOTE — CONSULT NOTE ADULT - ASSESSMENT
elevated lfts  uti    cont reg diet  lfts improving  elevation likely 2/2 sepsis/abx  check ruq u/s  d/w patient and  at bedside    I reviewed the overnight course of events on the unit, re-confirming the patient history. I discussed the care with the patient and their family  The plan of care was discussed with the physician assistant and modifications were made to the notation where appropriate.   Differential diagnosis and plan of care discussed with patient after the evaluation  35 minutes spent on total encounter of which more than fifty percent of the encounter was spent counseling and/or coordinating care by the attending physician.  Advanced care planning was discussed with patient and family.  Advanced care planning forms were reviewed and discussed.  Risks, benefits and alternatives of gastroenterologic procedures were discussed in detail and all questions were answered.  
59-year-old female with past medical history of migraines, UTI's, who presents with fevers and rash. Patient says she developed urinary frequency and was started on ciprofloxacin about 2 weeks ago. Urinary symptoms recurred after completing 7 days of cipro, and she was prescribed Bactrim by her GYN which she started on 11/12. On 11/16 AM, she developed nausea and fever. On 11/17,  noted that she had a diffuse rash.     Symptoms concerning for drug rash from Bactrim, but would continue empiric antibiotics pending cultures. Patient reports feeling better today, no oral ulcers seen. Bandemia resolved, WBC normal and no eosinophilia noted. LFT's improving today. CT renal stone hunt showed no obstructive uropathy.    #Rash  #Fever  #Bandemia  #Transaminitis  #UTI    -continue ceftriaxone  -blood and urine cultures pending  -monitor LFT's  -list Bactrim as allergy, patient advised not to take it in the future  -follow up with GYN/Urology as outpatient  -discussed with family at bedside    Thank you for courtesy of this consult.     Will follow.  Discussed with the primary team.     Gloria Andrade MD  Division of Infectious Diseases   Cell [phone] between 8am and 6pm   After 6pm and weekends please call ID service at [phone].     55 minutes spent on total encounter assessing patient, examination, chart review, counseling and coordinating care by the attending physician/nurse/care manager.

## 2024-11-19 NOTE — PROVIDER CONTACT NOTE (OTHER) - SITUATION
Pt admitted for rash possible cause Bactrim. pt states rash is worsening. symptoms are mild itching over entire body. Benadryl administered with no relief.

## 2024-11-19 NOTE — CASE MANAGEMENT PROGRESS NOTE - NSCMPROGRESSNOTE_GEN_ALL_CORE
Discussed pt on rounds, pt remains acute, on IV Ceftriaxone. CM will continue to collaborate with interdisciplinary team and remain available to assist.

## 2024-11-20 LAB
ALBUMIN SERPL ELPH-MCNC: 2.5 G/DL — LOW (ref 3.3–5)
ALP SERPL-CCNC: 60 U/L — SIGNIFICANT CHANGE UP (ref 40–120)
ALT FLD-CCNC: 102 U/L — HIGH (ref 12–78)
ANION GAP SERPL CALC-SCNC: 6 MMOL/L — SIGNIFICANT CHANGE UP (ref 5–17)
AST SERPL-CCNC: 31 U/L — SIGNIFICANT CHANGE UP (ref 15–37)
BASOPHILS # BLD AUTO: 0 K/UL — SIGNIFICANT CHANGE UP (ref 0–0.2)
BASOPHILS NFR BLD AUTO: 0 % — SIGNIFICANT CHANGE UP (ref 0–2)
BILIRUB DIRECT SERPL-MCNC: <0.1 MG/DL — SIGNIFICANT CHANGE UP (ref 0–0.3)
BILIRUB INDIRECT FLD-MCNC: SIGNIFICANT CHANGE UP MG/DL (ref 0.2–1)
BILIRUB SERPL-MCNC: <0.1 MG/DL — LOW (ref 0.2–1.2)
BUN SERPL-MCNC: 14 MG/DL — SIGNIFICANT CHANGE UP (ref 7–23)
CALCIUM SERPL-MCNC: 7.7 MG/DL — LOW (ref 8.5–10.1)
CHLORIDE SERPL-SCNC: 113 MMOL/L — HIGH (ref 96–108)
CO2 SERPL-SCNC: 25 MMOL/L — SIGNIFICANT CHANGE UP (ref 22–31)
CREAT SERPL-MCNC: 0.77 MG/DL — SIGNIFICANT CHANGE UP (ref 0.5–1.3)
CULTURE RESULTS: SIGNIFICANT CHANGE UP
EGFR: 89 ML/MIN/1.73M2 — SIGNIFICANT CHANGE UP
EOSINOPHIL # BLD AUTO: 0.13 K/UL — SIGNIFICANT CHANGE UP (ref 0–0.5)
EOSINOPHIL NFR BLD AUTO: 2 % — SIGNIFICANT CHANGE UP (ref 0–6)
GLUCOSE SERPL-MCNC: 101 MG/DL — HIGH (ref 70–99)
HCT VFR BLD CALC: 29.4 % — LOW (ref 34.5–45)
HGB BLD-MCNC: 10.1 G/DL — LOW (ref 11.5–15.5)
LYMPHOCYTES # BLD AUTO: 1.07 K/UL — SIGNIFICANT CHANGE UP (ref 1–3.3)
LYMPHOCYTES # BLD AUTO: 16 % — SIGNIFICANT CHANGE UP (ref 13–44)
MANUAL SMEAR VERIFICATION: SIGNIFICANT CHANGE UP
MCHC RBC-ENTMCNC: 32.6 PG — SIGNIFICANT CHANGE UP (ref 27–34)
MCHC RBC-ENTMCNC: 34.4 G/DL — SIGNIFICANT CHANGE UP (ref 32–36)
MCV RBC AUTO: 94.8 FL — SIGNIFICANT CHANGE UP (ref 80–100)
MONOCYTES # BLD AUTO: 0.4 K/UL — SIGNIFICANT CHANGE UP (ref 0–0.9)
MONOCYTES NFR BLD AUTO: 6 % — SIGNIFICANT CHANGE UP (ref 2–14)
NEUTROPHILS # BLD AUTO: 5.02 K/UL — SIGNIFICANT CHANGE UP (ref 1.8–7.4)
NEUTROPHILS NFR BLD AUTO: 70 % — SIGNIFICANT CHANGE UP (ref 43–77)
NEUTS BAND # BLD: 5 % — SIGNIFICANT CHANGE UP (ref 0–8)
NRBC # BLD: 0 /100 WBCS — SIGNIFICANT CHANGE UP (ref 0–0)
NRBC # BLD: 0 /100 WBCS — SIGNIFICANT CHANGE UP (ref 0–0)
PLAT MORPH BLD: NORMAL — SIGNIFICANT CHANGE UP
PLATELET # BLD AUTO: 122 K/UL — LOW (ref 150–400)
POTASSIUM SERPL-MCNC: 4.1 MMOL/L — SIGNIFICANT CHANGE UP (ref 3.5–5.3)
POTASSIUM SERPL-SCNC: 4.1 MMOL/L — SIGNIFICANT CHANGE UP (ref 3.5–5.3)
PROT SERPL-MCNC: 5.3 G/DL — LOW (ref 6–8.3)
RBC # BLD: 3.1 M/UL — LOW (ref 3.8–5.2)
RBC # FLD: 12.3 % — SIGNIFICANT CHANGE UP (ref 10.3–14.5)
RBC BLD AUTO: SIGNIFICANT CHANGE UP
SODIUM SERPL-SCNC: 144 MMOL/L — SIGNIFICANT CHANGE UP (ref 135–145)
SPECIMEN SOURCE: SIGNIFICANT CHANGE UP
VARIANT LYMPHS # BLD: 1 % — SIGNIFICANT CHANGE UP (ref 0–6)
WBC # BLD: 6.69 K/UL — SIGNIFICANT CHANGE UP (ref 3.8–10.5)
WBC # FLD AUTO: 6.69 K/UL — SIGNIFICANT CHANGE UP (ref 3.8–10.5)

## 2024-11-20 PROCEDURE — 76705 ECHO EXAM OF ABDOMEN: CPT | Mod: 26

## 2024-11-20 PROCEDURE — 99232 SBSQ HOSP IP/OBS MODERATE 35: CPT

## 2024-11-20 RX ORDER — POLYETHYLENE GLYCOL 3350 17 G/17G
17 POWDER, FOR SOLUTION ORAL ONCE
Refills: 0 | Status: COMPLETED | OUTPATIENT
Start: 2024-11-20 | End: 2024-11-20

## 2024-11-20 RX ORDER — DIPHENHYDRAMINE HCL 25 MG
25 CAPSULE ORAL ONCE
Refills: 0 | Status: COMPLETED | OUTPATIENT
Start: 2024-11-20 | End: 2024-11-20

## 2024-11-20 RX ORDER — METHYLPREDNISOLONE SOD SUCC 125 MG
40 VIAL (EA) INJECTION EVERY 12 HOURS
Refills: 0 | Status: DISCONTINUED | OUTPATIENT
Start: 2024-11-20 | End: 2024-11-21

## 2024-11-20 RX ORDER — METHYLPREDNISOLONE SOD SUCC 125 MG
125 VIAL (EA) INJECTION ONCE
Refills: 0 | Status: COMPLETED | OUTPATIENT
Start: 2024-11-20 | End: 2024-11-20

## 2024-11-20 RX ORDER — CALAMINE
1 LOTION (ML) TOPICAL EVERY 8 HOURS
Refills: 0 | Status: DISCONTINUED | OUTPATIENT
Start: 2024-11-20 | End: 2024-11-21

## 2024-11-20 RX ORDER — DIPHENHYDRAMINE HCL 25 MG
25 CAPSULE ORAL EVERY 6 HOURS
Refills: 0 | Status: DISCONTINUED | OUTPATIENT
Start: 2024-11-20 | End: 2024-11-21

## 2024-11-20 RX ORDER — SODIUM CHLORIDE 9 MG/ML
1000 INJECTION, SOLUTION INTRAMUSCULAR; INTRAVENOUS; SUBCUTANEOUS
Refills: 0 | Status: DISCONTINUED | OUTPATIENT
Start: 2024-11-20 | End: 2024-11-21

## 2024-11-20 RX ADMIN — SODIUM CHLORIDE 100 MILLILITER(S): 9 INJECTION, SOLUTION INTRAMUSCULAR; INTRAVENOUS; SUBCUTANEOUS at 10:04

## 2024-11-20 RX ADMIN — Medication 125 MILLIGRAM(S): at 10:04

## 2024-11-20 RX ADMIN — POLYETHYLENE GLYCOL 3350 17 GRAM(S): 17 POWDER, FOR SOLUTION ORAL at 18:09

## 2024-11-20 RX ADMIN — SODIUM CHLORIDE 100 MILLILITER(S): 9 INJECTION, SOLUTION INTRAMUSCULAR; INTRAVENOUS; SUBCUTANEOUS at 15:14

## 2024-11-20 RX ADMIN — Medication 25 MILLIGRAM(S): at 22:24

## 2024-11-20 RX ADMIN — Medication 40 MILLIGRAM(S): at 22:25

## 2024-11-20 RX ADMIN — Medication 25 MILLIGRAM(S): at 12:38

## 2024-11-20 RX ADMIN — Medication 25 MILLIGRAM(S): at 01:06

## 2024-11-20 RX ADMIN — FAMOTIDINE 20 MILLIGRAM(S): 20 TABLET, FILM COATED ORAL at 12:38

## 2024-11-20 NOTE — CASE MANAGEMENT PROGRESS NOTE - NSCMPROGRESSNOTE_GEN_ALL_CORE
Discussed pt on rounds, pt remains acute, on IV Ceftriaxone, received IV Benedryl, on IV Solumedrol. CM will continue to collaborate with interdisciplinary team and remain available to assist.

## 2024-11-21 ENCOUNTER — TRANSCRIPTION ENCOUNTER (OUTPATIENT)
Age: 59
End: 2024-11-21

## 2024-11-21 VITALS
SYSTOLIC BLOOD PRESSURE: 113 MMHG | RESPIRATION RATE: 18 BRPM | OXYGEN SATURATION: 94 % | DIASTOLIC BLOOD PRESSURE: 74 MMHG | TEMPERATURE: 98 F | HEART RATE: 67 BPM

## 2024-11-21 LAB
ALBUMIN SERPL ELPH-MCNC: 2.7 G/DL — LOW (ref 3.3–5)
ALP SERPL-CCNC: 62 U/L — SIGNIFICANT CHANGE UP (ref 40–120)
ALT FLD-CCNC: 96 U/L — HIGH (ref 12–78)
ANION GAP SERPL CALC-SCNC: 8 MMOL/L — SIGNIFICANT CHANGE UP (ref 5–17)
AST SERPL-CCNC: 33 U/L — SIGNIFICANT CHANGE UP (ref 15–37)
BILIRUB SERPL-MCNC: 0.5 MG/DL — SIGNIFICANT CHANGE UP (ref 0.2–1.2)
BUN SERPL-MCNC: 15 MG/DL — SIGNIFICANT CHANGE UP (ref 7–23)
CALCIUM SERPL-MCNC: 7.9 MG/DL — LOW (ref 8.5–10.1)
CHLORIDE SERPL-SCNC: 111 MMOL/L — HIGH (ref 96–108)
CO2 SERPL-SCNC: 24 MMOL/L — SIGNIFICANT CHANGE UP (ref 22–31)
CREAT SERPL-MCNC: 0.6 MG/DL — SIGNIFICANT CHANGE UP (ref 0.5–1.3)
EGFR: 104 ML/MIN/1.73M2 — SIGNIFICANT CHANGE UP
GLUCOSE SERPL-MCNC: 131 MG/DL — HIGH (ref 70–99)
HCT VFR BLD CALC: 30.7 % — LOW (ref 34.5–45)
HGB BLD-MCNC: 10.4 G/DL — LOW (ref 11.5–15.5)
MCHC RBC-ENTMCNC: 31.7 PG — SIGNIFICANT CHANGE UP (ref 27–34)
MCHC RBC-ENTMCNC: 33.9 G/DL — SIGNIFICANT CHANGE UP (ref 32–36)
MCV RBC AUTO: 93.6 FL — SIGNIFICANT CHANGE UP (ref 80–100)
NRBC # BLD: 0 /100 WBCS — SIGNIFICANT CHANGE UP (ref 0–0)
PLATELET # BLD AUTO: 156 K/UL — SIGNIFICANT CHANGE UP (ref 150–400)
POTASSIUM SERPL-MCNC: 4 MMOL/L — SIGNIFICANT CHANGE UP (ref 3.5–5.3)
POTASSIUM SERPL-SCNC: 4 MMOL/L — SIGNIFICANT CHANGE UP (ref 3.5–5.3)
PROT SERPL-MCNC: 5.6 G/DL — LOW (ref 6–8.3)
RBC # BLD: 3.28 M/UL — LOW (ref 3.8–5.2)
RBC # FLD: 12.4 % — SIGNIFICANT CHANGE UP (ref 10.3–14.5)
SODIUM SERPL-SCNC: 143 MMOL/L — SIGNIFICANT CHANGE UP (ref 135–145)
WBC # BLD: 7.07 K/UL — SIGNIFICANT CHANGE UP (ref 3.8–10.5)
WBC # FLD AUTO: 7.07 K/UL — SIGNIFICANT CHANGE UP (ref 3.8–10.5)

## 2024-11-21 PROCEDURE — 85027 COMPLETE CBC AUTOMATED: CPT

## 2024-11-21 PROCEDURE — 96375 TX/PRO/DX INJ NEW DRUG ADDON: CPT

## 2024-11-21 PROCEDURE — 71045 X-RAY EXAM CHEST 1 VIEW: CPT

## 2024-11-21 PROCEDURE — 80048 BASIC METABOLIC PNL TOTAL CA: CPT

## 2024-11-21 PROCEDURE — 74176 CT ABD & PELVIS W/O CONTRAST: CPT | Mod: MC

## 2024-11-21 PROCEDURE — 96376 TX/PRO/DX INJ SAME DRUG ADON: CPT

## 2024-11-21 PROCEDURE — 80053 COMPREHEN METABOLIC PANEL: CPT

## 2024-11-21 PROCEDURE — 84145 PROCALCITONIN (PCT): CPT

## 2024-11-21 PROCEDURE — 36415 COLL VENOUS BLD VENIPUNCTURE: CPT

## 2024-11-21 PROCEDURE — 76705 ECHO EXAM OF ABDOMEN: CPT

## 2024-11-21 PROCEDURE — 87086 URINE CULTURE/COLONY COUNT: CPT

## 2024-11-21 PROCEDURE — 99232 SBSQ HOSP IP/OBS MODERATE 35: CPT

## 2024-11-21 PROCEDURE — 85730 THROMBOPLASTIN TIME PARTIAL: CPT

## 2024-11-21 PROCEDURE — 93005 ELECTROCARDIOGRAM TRACING: CPT

## 2024-11-21 PROCEDURE — 81001 URINALYSIS AUTO W/SCOPE: CPT

## 2024-11-21 PROCEDURE — 0225U NFCT DS DNA&RNA 21 SARSCOV2: CPT

## 2024-11-21 PROCEDURE — 87040 BLOOD CULTURE FOR BACTERIA: CPT

## 2024-11-21 PROCEDURE — 99285 EMERGENCY DEPT VISIT HI MDM: CPT

## 2024-11-21 PROCEDURE — 85025 COMPLETE CBC W/AUTO DIFF WBC: CPT

## 2024-11-21 PROCEDURE — 85610 PROTHROMBIN TIME: CPT

## 2024-11-21 PROCEDURE — 80076 HEPATIC FUNCTION PANEL: CPT

## 2024-11-21 PROCEDURE — 83605 ASSAY OF LACTIC ACID: CPT

## 2024-11-21 PROCEDURE — 96374 THER/PROPH/DIAG INJ IV PUSH: CPT

## 2024-11-21 RX ORDER — PREDNISONE 20 MG/1
4 TABLET ORAL
Qty: 20 | Refills: 0
Start: 2024-11-21 | End: 2024-11-25

## 2024-11-21 RX ORDER — CALAMINE
1 LOTION (ML) TOPICAL
Qty: 0 | Refills: 0 | DISCHARGE
Start: 2024-11-21

## 2024-11-21 RX ORDER — DIPHENHYDRAMINE HCL 25 MG
1 CAPSULE ORAL
Qty: 0 | Refills: 0 | DISCHARGE
Start: 2024-11-21

## 2024-11-21 RX ADMIN — FAMOTIDINE 20 MILLIGRAM(S): 20 TABLET, FILM COATED ORAL at 11:38

## 2024-11-21 RX ADMIN — Medication 40 MILLIGRAM(S): at 09:37

## 2024-11-21 RX ADMIN — SODIUM CHLORIDE 100 MILLILITER(S): 9 INJECTION, SOLUTION INTRAMUSCULAR; INTRAVENOUS; SUBCUTANEOUS at 01:47

## 2024-11-21 NOTE — DISCHARGE NOTE NURSING/CASE MANAGEMENT/SOCIAL WORK - FINANCIAL ASSISTANCE
[facility] provides services at a reduced cost to those who are determined to be eligible through [facility]’s financial assistance program. Information regarding [facility]’s financial assistance program can be found by going to [email or web address] or by calling [phone].

## 2024-11-21 NOTE — DISCHARGE NOTE PROVIDER - HOSPITAL COURSE
Patient is a 59-year-old female with past medical history of migraines presents with allergic reaction.  Patient reports she has been taking Bactrim for UTI for the past week.  Patient yesterday developed diffuse rash without chest pain shortness of breath or trouble breathing.  Patient went to urgent care today who noted her to have a fever high heart rate and low blood pressure.  Patient was sent to ED for evaluation.  Patient notes since Saturday she has had a fever treated with DayQuil NyQuil and ibuprofen.  Patient reports associated vomiting and back pain.  Patient reports urinary symptoms have improved.  Patient denies cough shortness of breath chest pain abdominal pain diarrhea. Pt was admitted for possible sepsis and acute anaphyaxsis. She was ruled out for for acute sepsis with neg urine and blood cultures, her wbc is stable, She was also noted to be in lexa due to anaphylaxsis and dehydration - renal function normalized after aggressive ivf. She was seen by the ID team and her clinical presentation was attributed to delayed bactrim allergy. She received multiple doses of iv steroids and benadryl with improvement. She is being transitioned to oral steroids and oral benadryl and will fu with her pcp for resolution. She is advised to have outpt allergy testing.      >35 minutes spent on discharge

## 2024-11-21 NOTE — PROGRESS NOTE ADULT - PROBLEM SELECTOR PLAN 1
ruled out with neg cultures  discussed with id  nml wbc and afebrile  off abx
pt presents with acute sepsis  fu all cultures  urine may be source   she does state she had episode of diarrhea after eating outside food which has now subsided  gi pcr stool ordered  continue rocephin  bacid  id and gi eval  ivf  trend labs, lactate  procalcitonin level
resolving  dc iv  abx per id discussion given worsening of skin rash  cultures to date are negative  ivf  monitor clinical status  remains afebrile  labs improving

## 2024-11-21 NOTE — PROGRESS NOTE ADULT - REASON FOR ADMISSION
anaphylasis and sepsis

## 2024-11-21 NOTE — CASE MANAGEMENT PROGRESS NOTE - NSCMPROGRESSNOTE_GEN_ALL_CORE
Per MD pt is medically cleared for discharge today 11/21/2024. CM met with patient / daughter to discuss transition of care. Pt gave verbal consent to speak with daughter at bedside. Pt is to be transitioned to home with no formal home care services, pt offered home care services, pt declined home care services. CM explained home care services, expectations, process, insurance provisions and home safety with pt verbalizing understanding.  Pt states  and daughter will assist post discharge. Pt aware of plan and in agreement / verbalizes understanding. Discharge notice discussed / given. Pt verbalized understanding. Confirmed pharmacy is [facility]. community MD is Dr. Foster. Pt stated they would make their own follow up appointments. Pt denies DME and need for usage. Pt stated her daughter will transport pt home.  All questions answered. CM discussed plan with MD and RN. CM to remain available through hospitalization.

## 2024-11-21 NOTE — DISCHARGE NOTE PROVIDER - NSDCCPCAREPLAN_GEN_ALL_CORE_FT
PRINCIPAL DISCHARGE DIAGNOSIS  Diagnosis: Allergic reaction  Assessment and Plan of Treatment: finish course of steroids  as needed benadryl  fu with pcp and allergist as outpt      SECONDARY DISCHARGE DIAGNOSES  Diagnosis: Allergic reaction  Assessment and Plan of Treatment:

## 2024-11-21 NOTE — DISCHARGE NOTE NURSING/CASE MANAGEMENT/SOCIAL WORK - PATIENT PORTAL LINK FT
You can access the FollowMyHealth Patient Portal offered by [facility] by registering at the following website: [email or web address]. By joining [facility]’s FollowMyHealth portal, you will also be able to view your health information using other applications (apps) compatible with our system.

## 2024-11-21 NOTE — PROGRESS NOTE ADULT - PROBLEM SELECTOR PLAN 2
may be due to bactrim- sulfa  hold all sulfa meds  prn benadryl  ivf  given steroids in er with improvement, will give solumedrol iv again given worsening of rash  calamine lotion  cold shower  dc rocephin  diffuse rash - moniter clinical resolution
may be due to bactrim- sulfa  hold all sulfa meds  prn benadryl  ivf  given steroids in er with improvement  diffuse rash - moniter clinical resolution
may be due to bactrim- sulfa  hold all sulfa meds  prn benadryl  improvement with doses of iv steroids  change to oral prednisone for 5 days with benadryl  fu with pcp  hold all sulfa meds  outpt allergery chico suggested to pt and family

## 2024-11-21 NOTE — DISCHARGE NOTE NURSING/CASE MANAGEMENT/SOCIAL WORK - NSDCVIVACCINE_GEN_ALL_CORE_FT
"Chief Complaint  Mood Disorder    Subjective    History of Present Illness      Patient presents to [facility] for   History of Present Illness  Mood Disorder follow up.        Review of Systems   Psychiatric/Behavioral: Positive for agitation.   All other systems reviewed and are negative.      I have reviewed and agree with the HPI and ROS information as above.  ANNETTE Burt     Objective   Vital Signs:   /86 (BP Location: Right arm, Patient Position: Sitting, Cuff Size: Adult)   Pulse 77   Temp 98.2 °F (36.8 °C) (Temporal)   Ht 165.1 cm (65\")   Wt 109 kg (240 lb 8 oz)   SpO2 98%   BMI 40.02 kg/m²            Physical Exam  Constitutional:       Appearance: Normal appearance. She is well-developed. She is obese.   HENT:      Head: Normocephalic and atraumatic.      Right Ear: Tympanic membrane, ear canal and external ear normal.      Left Ear: Tympanic membrane, ear canal and external ear normal.      Nose: Nose normal. No septal deviation, nasal tenderness or congestion.      Mouth/Throat:      Lips: Pink. No lesions.      Mouth: Mucous membranes are moist. No oral lesions.      Dentition: Normal dentition.      Pharynx: Oropharynx is clear. No pharyngeal swelling, oropharyngeal exudate or posterior oropharyngeal erythema.   Eyes:      General: Lids are normal. Vision grossly intact. No scleral icterus.        Right eye: No discharge.         Left eye: No discharge.      Extraocular Movements: Extraocular movements intact.      Conjunctiva/sclera: Conjunctivae normal.      Right eye: Right conjunctiva is not injected.      Left eye: Left conjunctiva is not injected.      Pupils: Pupils are equal, round, and reactive to light.   Neck:      Thyroid: No thyroid mass.      Trachea: Trachea normal.   Cardiovascular:      Rate and Rhythm: Normal rate and regular rhythm.      Heart sounds: Normal heart sounds. No murmur heard.    No gallop.   Pulmonary:      Effort: " Pulmonary effort is normal.      Breath sounds: Normal breath sounds and air entry. No wheezing, rhonchi or rales.   Abdominal:      General: There is no distension.      Palpations: Abdomen is soft. There is no mass.      Tenderness: There is no abdominal tenderness. There is no right CVA tenderness, left CVA tenderness, guarding or rebound.   Musculoskeletal:         General: No tenderness or deformity. Normal range of motion.      Cervical back: Full passive range of motion without pain, normal range of motion and neck supple.      Thoracic back: Normal.      Right lower leg: No edema.      Left lower leg: No edema.   Skin:     General: Skin is warm and dry.      Coloration: Skin is not jaundiced.      Findings: No rash.   Neurological:      Mental Status: She is alert and oriented to person, place, and time.      Cranial Nerves: Cranial nerves are intact.      Sensory: Sensation is intact.      Motor: Motor function is intact.      Coordination: Coordination is intact.      Gait: Gait is intact.      Deep Tendon Reflexes: Reflexes are normal and symmetric.   Psychiatric:         Mood and Affect: Mood and affect normal.         Judgment: Judgment normal.          SANIA-7:      PHQ-2 Depression Screening  Little interest or pleasure in doing things?     Feeling down, depressed, or hopeless?     PHQ-2 Total Score       PHQ-9 Depression Screening  Little interest or pleasure in doing things?     Feeling down, depressed, or hopeless?     Trouble falling or staying asleep, or sleeping too much?     Feeling tired or having little energy?     Poor appetite or overeating?     Feeling bad about yourself - or that you are a failure or have let yourself or your family down?     Trouble concentrating on things, such as reading the newspaper or watching television?     Moving or speaking so slowly that other people could have noticed? Or the opposite - being so fidgety or restless that you have been moving around a lot more  "than usual?     Thoughts that you would be better off dead, or of hurting yourself in some way?     PHQ-9 Total Score     If you checked off any problems, how difficult have these problems made it for you to do your work, take care of things at home, or get along with other people?        Result Review  Data Reviewed:                   Assessment and Plan      Problem List Items Addressed This Visit        Mental Health    Mood disorder ([facility]) - Primary    Relevant Medications    lamoTRIgine (LaMICtal) 100 MG tablet   Other Visit Diagnoses     Class 3 severe obesity due to excess calories without serious comorbidity with body mass index (BMI) of 40.0 to 44.9 in adult ([facility])          Patient is seen today for mood disorder follow up. On entering exam room today patient appeared very frustrated. She states \"My momma makes me so da** mad, cause shes constantly talking over me and treating me like im stupid and it makes me so mad.\" She then proceeds to put her mother on the phone who is questioning whether her daughter is taking her medications correctly and if there were used for sleep or mood. I discussed with her that this was used for mood. Mother then proceeds to say that this wasn't helping. She then got off the phone and stated that she felt that her meds were fine but her mother was the problem. I discussed with her that I could not fix that situation with a medication. She states that she does feel that the increase in Lamictal has helped but she still struggles with anger and getting frustrated easily. She states that for instance when people drive slow it makes her mad because she likes to drive fast. I discussed with her I felt that it would be appropriate to increase his Lamictal at this time. Patient agrees. We discussed that if we had no improvement in moods with this increase that we will add in another class of medication.       Plan  1. Increase Lamictal to 200mg. Medication education done, S/E " discussed.         Follow Up   Return in about 1 month (around 11/21/2022).  Patient was given instructions and counseling regarding her condition or for health maintenance advice. Please see specific information pulled into the AVS if appropriate.        No Vaccines Administered.

## 2024-11-21 NOTE — DISCHARGE NOTE PROVIDER - CARE PROVIDER_API CALL
Venkata Foster  Internal Medicine  [address], [address]  [place] [address]  Phone: [phone]  Fax: [phone]  Follow Up Time: 1 week

## 2024-11-21 NOTE — PROGRESS NOTE ADULT - ASSESSMENT
elevated lfts  uti    cont reg diet  lfts improving  elevation likely 2/2 sepsis/abx  Awaiting US report    I reviewed the overnight course of events on the unit, re-confirming the patient history. I discussed the care with the patient  Differential diagnosis and plan of care discussed with patient after the evaluation  35 minutes spent on total encounter of which more than fifty percent of the encounter was spent counseling and/or coordinating care by the attending physician.
59-year-old female with past medical history of migraines, UTI's, who presents with fevers and rash. Patient says she developed urinary frequency and was started on ciprofloxacin about 2 weeks ago. Urinary symptoms recurred after completing 7 days of cipro, and she was prescribed Bactrim by her GYN which she started on 11/12. On 11/16 AM, she developed nausea and fever. On 11/17,  noted that she had a diffuse rash.     Rash and other symptoms concerning for drug rash from Bactrim. Feels better today with steroids. Otherwise no current signs/symptoms of bacterial infection, would stop antibiotics. Fever resolved, WBC normal and platelet improved. LFT's improving. Blood cultures currently no growth. Urine culture unrevealing. CT renal stone hunt showed no obstructive uropathy.    #Rash  #Fever  #Bandemia  #Transaminitis  #UTI  #Adverse reaction from Bactrim    -suggest Solu-medrol IV, if continues to improve plan to switch to prednisone  -discontinue ceftriaxone  -follow cultures to completion  -list Bactrim as allergy, patient advised not to take it or other sulfa drugs in the future  -close follow up as outpatient  -discussed with family at bedside  -discussed with Dr. Nava Andrade MD  Division of Infectious Diseases   Cell [phone] between 8am and 6pm   After 6pm and weekends please call ID service at [phone].     35 minutes spent on total encounter assessing patient, examination, chart review, counseling and coordinating care by the attending physician/nurse/care manager.     
59-year-old female with past medical history of migraines, UTI's, who presents with fevers and rash. Patient says she developed urinary frequency and was started on ciprofloxacin about 2 weeks ago. Urinary symptoms recurred after completing 7 days of cipro, and she was prescribed Bactrim by her GYN which she started on 11/12. On 11/16 AM, she developed nausea and fever. On 11/17,  noted that she had a diffuse rash.     Rash and other symptoms concerning for drug rash from Bactrim. Much improved after steroids were started. Reports feeling better overall, and stable off antibiotics. No fevers. WBC normal and platelets, LFT's improving. Blood cultures remain no growth. Urine culture unrevealing. CT renal stone hunt showed no obstructive uropathy.    #Rash  #Fever  #Bandemia  #Transaminitis  #UTI  #Adverse reaction from Bactrim    -suggest course of prednisone   -follow cultures to completion  -list Bactrim as allergy, patient advised not to take it or other sulfa drugs in the future  -close follow up as outpatient  -if rash worse, or develops any oral/mucosal lesions--advised to go back to the ER  -discussed with family at bedside    Gloria Andrade MD  Division of Infectious Diseases   Cell [phone] between 8am and 6pm   After 6pm and weekends please call ID service at [phone].     35 minutes spent on total encounter assessing patient, examination, chart review, counseling and coordinating care by the attending physician/nurse/care manager.     
elevated lfts  uti    cont reg diet  lfts improving  elevation likely 2/2 sepsis/abx  US noted and d/w patient  pt can follow up with PCP to monitor LFT  d/c planning  d/w pt    I reviewed the overnight course of events on the unit, re-confirming the patient history. I discussed the care with the patient  Differential diagnosis and plan of care discussed with patient after the evaluation  35 minutes spent on total encounter of which more than fifty percent of the encounter was spent counseling and/or coordinating care by the attending physician.

## 2024-11-21 NOTE — PROGRESS NOTE ADULT - PROVIDER SPECIALTY LIST ADULT
Internal Medicine
Gastroenterology
Gastroenterology
Infectious Disease
Infectious Disease
Internal Medicine
Internal Medicine

## 2024-11-21 NOTE — PROGRESS NOTE ADULT - PROBLEM SELECTOR PLAN 5
due to sepsis  no prior hx  trend labs  gi eval noted
due to sepsis  no prior hx  trend labs  gi eval noted
due to sepsis  no prior hx  trend labs- improving today  gi eval

## 2024-11-21 NOTE — PROGRESS NOTE ADULT - SUBJECTIVE AND OBJECTIVE BOX
[facility]  INFECTIOUS DISEASES - Yvon Metcalf, [address], [place] [address]  Tel: [phone]     Fax: [phone]  =======================================================    WILLIAMMARK RILEY [id number]    Follow up: Yesterday around 5pm developing diffuse itching. Only temporary relief with Benadryl. This morning started on IV Solu-medrol, reports feeling better. Reports rash and itching much better. Denies any SOB.     Allergies:  Bactrim (Rash)  codeine (Vomiting; Nausea)      Antibiotics:  acetaminophen     Tablet .. 650 milliGRAM(s) Oral every 6 hours PRN  calamine/zinc oxide Lotion 1 Application(s) Topical every 8 hours  diphenhydrAMINE 25 milliGRAM(s) Oral every 6 hours PRN  famotidine    Tablet 20 milliGRAM(s) Oral daily  ondansetron Injectable 4 milliGRAM(s) IV Push every 4 hours PRN  sodium chloride 0.9%. 1000 milliLiter(s) IV Continuous <Continuous>       REVIEW OF SYSTEMS:  CONSTITUTIONAL: No fevers  HEENT:  No sore throat or runny nose.  CARDIOVASCULAR:  No chest pain or SOB.  RESPIRATORY:  No cough, shortness of breath  GASTROINTESTINAL:  No  vomiting or diarrhea. no abdominal pain  GENITOURINARY:  No dysuria  MUSCULOSKELETAL:  no joint aches/swelling  SKIN:  + rash  NEUROLOGIC:  + headache--resolved with Tylenol. no dizziness  PSYCHIATRIC:  No disorder of thought or mood.     Physical Exam:  ICU Vital Signs Last 24 Hrs  T(C): 36.6 (20 Nov 2024 12:19), Max: 37.1 (20 Nov 2024 05:51)  T(F): 97.9 (20 Nov 2024 12:19), Max: 98.7 (20 Nov 2024 05:51)  HR: 66 (20 Nov 2024 12:19) (65 - 73)  BP: 97/60 (20 Nov 2024 12:19) (88/56 - 97/60)  BP(mean): --  ABP: --  ABP(mean): --  RR: 17 (20 Nov 2024 12:19) (17 - 18)  SpO2: 95% (20 Nov 2024 12:19) (92% - 96%)    O2 Parameters below as of 20 Nov 2024 12:19  Patient On (Oxygen Delivery Method): room air      GEN: NAD  HEENT: normocephalic and atraumatic. no oral lesions  NECK: Supple.   LUNGS: Normal respiratory effort  HEART: Regular rate and rhythm   ABDOMEN: Soft, nontender, and nondistended.    EXTREMITIES: No leg edema.  NEUROLOGIC: grossly intact.  PSYCHIATRIC: Appropriate affect .  SKIN: diffuse erythematous maculopapular rash on back, chest/abdomen, and extremities    Labs:  11-20    144  |  113[H]  |  14  ----------------------------<  101[H]  4.1   |  25  |  0.77    Ca    7.7[L]      20 Nov 2024 07:00    TPro  5.3[L]  /  Alb  2.5[L]  /  TBili  <0.1[L]  /  DBili  <0.1  /  AST  31  /  ALT  102[H]  /  AlkPhos  60  11-20                          10.1   6.69  )-----------( 122      ( 20 Nov 2024 07:00 )             29.4     PT/INR - ( 18 Nov 2024 20:05 )   PT: 12.9 sec;   INR: 1.09 ratio         PTT - ( 18 Nov 2024 20:05 )  PTT:27.3 sec  Urinalysis Basic - ( 20 Nov 2024 07:00 )    Color: x / Appearance: x / SG: x / pH: x  Gluc: 101 mg/dL / Ketone: x  / Bili: x / Urobili: x   Blood: x / Protein: x / Nitrite: x   Leuk Esterase: x / RBC: x / WBC x   Sq Epi: x / Non Sq Epi: x / Bacteria: x      LIVER FUNCTIONS - ( 20 Nov 2024 07:00 )  Alb: 2.5 g/dL / Pro: 5.3 g/dL / ALK PHOS: 60 U/L / ALT: 102 U/L / AST: 31 U/L / GGT: x             RECENT CULTURES:  11-18 @ 21:55 Clean Catch Clean Catch (Midstream)     <10,000 CFU/mL Normal Urogenital Venecia        11-18 @ 20:25          NotDetec  11-18 @ 20:05 .Blood BLOOD     No growth at 24 hours        11-18 @ 19:55 .Blood BLOOD     No growth at 24 hours              All imaging and data are reviewed.     
  Date of Service: 11-20-24 @ 09:58    Patient is a 59y old  Female who presents with a chief complaint of anaphylasis and sepsis (19 Nov 2024 12:18)       INTERVAL HPI/OVERNIGHT EVENTS:pt with worsening rash all over body overnight    MEDICATIONS  (STANDING):  calamine/zinc oxide Lotion 1 Application(s) Topical every 8 hours  famotidine    Tablet 20 milliGRAM(s) Oral daily  methylPREDNISolone sodium succinate Injectable 125 milliGRAM(s) IV Push once  sodium chloride 0.9%. 1000 milliLiter(s) (100 mL/Hr) IV Continuous <Continuous>    MEDICATIONS  (PRN):  acetaminophen     Tablet .. 650 milliGRAM(s) Oral every 6 hours PRN Moderate Pain (4 - 6)  diphenhydrAMINE 25 milliGRAM(s) Oral every 6 hours PRN Rash and/or Itching  ondansetron Injectable 4 milliGRAM(s) IV Push every 4 hours PRN Nausea and/or Vomiting      Allergies    Bactrim (Rash)  codeine (Vomiting; Nausea)    Intolerances        REVIEW OF SYSTEMS:  CONSTITUTIONAL: No fever, weight loss, or fatigue  EYES: No eye pain, visual disturbances  ENMT:  No difficulty hearing, tinnitus, vertigo; No sinus or throat pain  NECK: No pain or stiffness  RESPIRATORY: No cough, wheezing, chills or hemoptysis; No shortness of breath  CARDIOVASCULAR: No chest pain, palpitations, dizziness  GASTROINTESTINAL: No abdominal or epigastric pain. No nausea, vomiting, or hematemesis; No diarrhea or constipation. No melena or hematochezia.  GENITOURINARY: No dysuria, frequency, hematuria, or incontinence  NEUROLOGICAL: No headaches, memory loss, loss of strength, numbness, or tremors  SKIN: itchng and burning and diffuse rash  LYMPH NODES: No enlarged glands  MUSCULOSKELETAL: No joint pain or swelling; No muscle, back, or extremity pain  PSYCHIATRIC: No depression, mood swings  HEME/LYMPH: No easy bruising, or bleeding gums  ALLERGY AND IMMUNOLOGIC: No hives    Vital Signs Last 24 Hrs  T(C): 37.1 (20 Nov 2024 05:51), Max: 37.6 (19 Nov 2024 11:07)  T(F): 98.7 (20 Nov 2024 05:51), Max: 99.7 (19 Nov 2024 11:07)  HR: 73 (20 Nov 2024 05:51) (65 - 91)  BP: 90/53 (20 Nov 2024 05:51) (88/56 - 91/52)  BP(mean): --  RR: 18 (20 Nov 2024 05:51) (17 - 18)  SpO2: 92% (20 Nov 2024 05:51) (92% - 96%)    Parameters below as of 20 Nov 2024 05:51  Patient On (Oxygen Delivery Method): room air        PHYSICAL EXAM:  GENERAL:distress from itch and pain  HEAD:  Atraumatic, Normocephalic  EYES: EOMI, PERRLA, conjunctiva and sclera clear  ENMT: No tonsillar erythema, exudates, or enlargement   NECK: Supple, No JVD  NERVOUS SYSTEM:  Alert & Oriented X3, Good concentration  CHEST/LUNG: Clear to auscultation bilaterally; No rales, rhonchi, wheezing  HEART: Regular rate and rhythm  ABDOMEN: Soft, Nontender, Nondistended; Bowel sounds present  EXTREMITIES:  2+ Peripheral Pulses   LYMPH: No lymphadenopathy noted  SKIN: diffuse purpuric rash all over body, now on le and back     LABS:                        10.1   6.69  )-----------( 122      ( 20 Nov 2024 07:00 )             29.4     20 Nov 2024 07:00    144    |  113    |  14     ----------------------------<  101    4.1     |  25     |  0.77     Ca    7.7        20 Nov 2024 07:00      PT/INR - ( 18 Nov 2024 20:05 )   PT: 12.9 sec;   INR: 1.09 ratio         PTT - ( 18 Nov 2024 20:05 )  PTT:27.3 sec  Urinalysis Basic - ( 20 Nov 2024 07:00 )    Color: x / Appearance: x / SG: x / pH: x  Gluc: 101 mg/dL / Ketone: x  / Bili: x / Urobili: x   Blood: x / Protein: x / Nitrite: x   Leuk Esterase: x / RBC: x / WBC x   Sq Epi: x / Non Sq Epi: x / Bacteria: x      CAPILLARY BLOOD GLUCOSE        blood culture --   <10,000 CFU/mL Normal Urogenital Venecia   11-18 @ 21:55    blood culture --   No growth at 24 hours   11-18 @ 20:05    blood culture --   No growth at 24 hours   11-18 @ 19:55      urine culture --  11-18 @ 21:55  results   <10,000 CFU/mL Normal Urogenital Venecia 11-18 @ 21:55  urine culture --  11-18 @ 20:05  results   No growth at 24 hours 11-18 @ 20:05  urine culture --  11-18 @ 19:55  results   No growth at 24 hours 11-18 @ 19:55    wound with gram statin --    11-18 @ 21:55  organism  --   11-18 @ 21:55  specimen source Clean Catch Clean Catch (Midstream)  11-18 @ 21:55  wound with gram statin --    11-18 @ 20:05  organism  --   11-18 @ 20:05  specimen source .Blood BLOOD  11-18 @ 20:05  wound with gram statin --    11-18 @ 19:55  organism  --   11-18 @ 19:55  specimen source .Blood BLOOD  11-18 @ 19:55      RADIOLOGY & ADDITIONAL TESTS:      Consultant(s) Notes Reviewed:  [x ] YES  [ ] NO    Care Discussed with Consultants/Other Providers [ x] YES  [ ] NO    Advanced care planning discussed with patient and family, advanced care planning forms reviewed, discussed, and completed.  20 minutes spent.  
[facility]  Colten Bernard PA-C  [facility] [place] [address]  [phone]      INTERVAL HPI/OVERNIGHT EVENTS:  Pt s/e  LFTs trending down and US discussed with patient    MEDICATIONS  (STANDING):  calamine/zinc oxide Lotion 1 Application(s) Topical every 8 hours  famotidine    Tablet 20 milliGRAM(s) Oral daily  methylPREDNISolone sodium succinate Injectable 40 milliGRAM(s) IV Push every 12 hours  sodium chloride 0.9%. 1000 milliLiter(s) (100 mL/Hr) IV Continuous <Continuous>    MEDICATIONS  (PRN):  acetaminophen     Tablet .. 650 milliGRAM(s) Oral every 6 hours PRN Moderate Pain (4 - 6)  diphenhydrAMINE 25 milliGRAM(s) Oral every 6 hours PRN Itching  ondansetron Injectable 4 milliGRAM(s) IV Push every 4 hours PRN Nausea and/or Vomiting      Allergies  Bactrim (Rash)  codeine (Vomiting; Nausea)    PHYSICAL EXAM:   Vital Signs:  Vital Signs Last 24 Hrs  T(C): 36.9 (2024 11:07), Max: 36.9 (2024 11:07)  T(F): 98.4 (2024 11:07), Max: 98.4 (2024 11:07)  HR: 67 (2024 11:07) (56 - 75)  BP: 113/74 (2024 11:07) (97/60 - 113/74)  BP(mean): --  RR: 18 (2024 11:07) (17 - 18)  SpO2: 94% (2024 11:07) (92% - 95%)    Parameters below as of 2024 11:07  Patient On (Oxygen Delivery Method): room air      Daily     Daily Weight in k.2 (2024 05:10)    GENERAL:  Appears stated age  HEENT:  NC/AT  CHEST:  Full & symmetric excursion  HEART:  Regular rhythm  ABDOMEN:  Soft, non-tender, non-distended  EXTEREMITIES:  no cyanosis  SKIN:  No rash  NEURO:  Alert      LABS:                        10.4   7.07  )-----------( 156      ( 2024 08:09 )             30.7         143  |  111[H]  |  15  ----------------------------<  131[H]  4.0   |  24  |  0.60    Ca    7.9[L]      2024 08:09    TPro  5.6[L]  /  Alb  2.7[L]  /  TBili  0.5  /  DBili  x   /  AST  33  /  ALT  96[H]  /  AlkPhos  62        Urinalysis Basic - ( 2024 08:09 )    Color: x / Appearance: x / SG: x / pH: x  Gluc: 131 mg/dL / Ketone: x  / Bili: x / Urobili: x   Blood: x / Protein: x / Nitrite: x   Leuk Esterase: x / RBC: x / WBC x   Sq Epi: x / Non Sq Epi: x / Bacteria: x      RADIOLOGY   < from: US Abdomen Upper Quadrant Right (24 @ 12:25) >    ACC: [id number] EXAM:  US ABDOMEN RT UPR QUADRANT   ORDERED BY: RIK JUSTIN     PROCEDURE DATE:  2024          INTERPRETATION:  CLINICAL INFORMATION: Transaminitis    COMPARISON: No prior abdominal ultrasounds available for comparison.   Reference is made with a previous noncontrast abdominal CT dated   2024.    TECHNIQUE: Sonography of the right upper quadrant.    FINDINGS:  Liver: Within normal limits.  Bile ducts: Normal caliber. Common bile duct measures 5 mm.  Gallbladder: Status post cholecystectomy.  Pancreas: Visualized portions are within normal limits.  Right kidney: 9.4 cm. No hydronephrosis. The lower pole of the right   kidney is obscured by overlying bowel gas; the visualized portions of the   right kidney appear unremarkable.  Ascites: None.  IVC: Visualized portions are within normal limits.    IMPRESSION:  Normal right upper quadrant abdominal ultrasound.    --- End of Report ---    ZARA ELLIS MD; Attending Radiologist  This document has been electronically signed. 2024  8:00PM    < end of copied text >  
[facility]  Coletn Bernard PA-C  [facility] [place] [address] [phone]      INTERVAL HPI/OVERNIGHT EVENTS:  Pt awaiting US results  LFTs trending down    MEDICATIONS  (STANDING):  calamine/zinc oxide Lotion 1 Application(s) Topical every 8 hours  famotidine    Tablet 20 milliGRAM(s) Oral daily  sodium chloride 0.9%. 1000 milliLiter(s) (100 mL/Hr) IV Continuous <Continuous>    MEDICATIONS  (PRN):  acetaminophen     Tablet .. 650 milliGRAM(s) Oral every 6 hours PRN Moderate Pain (4 - 6)  diphenhydrAMINE 25 milliGRAM(s) Oral every 6 hours PRN Itching  ondansetron Injectable 4 milliGRAM(s) IV Push every 4 hours PRN Nausea and/or Vomiting      Allergies    Bactrim (Rash)  codeine (Vomiting; Nausea)      PHYSICAL EXAM:   Vital Signs:  Vital Signs Last 24 Hrs  T(C): 36.6 (2024 12:19), Max: 37.1 (2024 05:51)  T(F): 97.9 (2024 12:19), Max: 98.7 (2024 05:51)  HR: 66 (2024 12:19) (65 - 73)  BP: 97/60 (2024 12:19) (88/56 - 97/60)  BP(mean): --  RR: 17 (2024 12:19) (17 - 18)  SpO2: 95% (2024 12:19) (92% - 96%)    Parameters below as of 2024 12:19  Patient On (Oxygen Delivery Method): room air      Daily     Daily Weight in k (2024 05:51)    GENERAL:  Appears stated age  HEENT:  NC/AT  CHEST:  Full & symmetric excursion  HEART:  Regular rhythm  ABDOMEN:  Soft, non-tender, non-distended  EXTEREMITIES:  no cyanosis  SKIN:  No rash  NEURO:  Alert      LABS:                        10.1   6.69  )-----------( 122      ( 2024 07:00 )             29.4     11-    144  |  113[H]  |  14  ----------------------------<  101[H]  4.1   |  25  |  0.77    Ca    7.7[L]      2024 07:00    TPro  5.3[L]  /  Alb  2.5[L]  /  TBili  <0.1[L]  /  DBili  <0.1  /  AST  31  /  ALT  102[H]  /  AlkPhos  60  11-20    PT/INR - ( 2024 20:05 )   PT: 12.9 sec;   INR: 1.09 ratio         PTT - ( 2024 20:05 )  PTT:27.3 sec  Urinalysis Basic - ( 2024 07:00 )    Color: x / Appearance: x / SG: x / pH: x  Gluc: 101 mg/dL / Ketone: x  / Bili: x / Urobili: x   Blood: x / Protein: x / Nitrite: x   Leuk Esterase: x / RBC: x / WBC x   Sq Epi: x / Non Sq Epi: x / Bacteria: x    
[facility]  INFECTIOUS DISEASES - Yvon Metcalf, [place] [address]  Tel: [phone]     Fax: [phone]  =======================================================    WILLIAMMARK RILEY [id number]    Follow up: No fevers. Reports feeling better. Denies any more itching since steroids were initiated. Thinks rash is a little improved. Denies any oral or other mucosal lesions.    Allergies:  Bactrim (Rash)  codeine (Vomiting; Nausea)      Antibiotics:  acetaminophen     Tablet .. 650 milliGRAM(s) Oral every 6 hours PRN  calamine/zinc oxide Lotion 1 Application(s) Topical every 8 hours  diphenhydrAMINE 25 milliGRAM(s) Oral every 6 hours PRN  famotidine    Tablet 20 milliGRAM(s) Oral daily  methylPREDNISolone sodium succinate Injectable 40 milliGRAM(s) IV Push every 12 hours  ondansetron Injectable 4 milliGRAM(s) IV Push every 4 hours PRN  sodium chloride 0.9%. 1000 milliLiter(s) IV Continuous <Continuous>       REVIEW OF SYSTEMS:  CONSTITUTIONAL: No fevers  HEENT:  No sore throat or runny nose.  CARDIOVASCULAR:  No chest pain or SOB.  RESPIRATORY:  No cough, shortness of breath  GASTROINTESTINAL:  No  vomiting or diarrhea. no abdominal pain  GENITOURINARY:  No dysuria  MUSCULOSKELETAL:  no joint aches/swelling  SKIN:  + rash  NEUROLOGIC:  No headache or dizziness  PSYCHIATRIC:  No disorder of thought or mood.     Physical Exam:  ICU Vital Signs Last 24 Hrs  T(C): 36.9 (21 Nov 2024 11:07), Max: 36.9 (21 Nov 2024 11:07)  T(F): 98.4 (21 Nov 2024 11:07), Max: 98.4 (21 Nov 2024 11:07)  HR: 67 (21 Nov 2024 11:07) (56 - 75)  BP: 113/74 (21 Nov 2024 11:07) (104/67 - 113/74)  BP(mean): --  ABP: --  ABP(mean): --  RR: 18 (21 Nov 2024 11:07) (17 - 18)  SpO2: 94% (21 Nov 2024 11:07) (92% - 95%)    O2 Parameters below as of 21 Nov 2024 11:07  Patient On (Oxygen Delivery Method): room air        GEN: NAD  HEENT: normocephalic and atraumatic. no oral lesions  NECK: Supple.   LUNGS: Normal respiratory effort  HEART: Regular rate and rhythm   ABDOMEN: Soft, nontender, and nondistended.    EXTREMITIES: No leg edema.  NEUROLOGIC: grossly intact.  PSYCHIATRIC: Appropriate affect .  SKIN: diffuse erythematous maculopapular rash on back, chest/abdomen, and extremities        Labs:  11-21    143  |  111[H]  |  15  ----------------------------<  131[H]  4.0   |  24  |  0.60    Ca    7.9[L]      21 Nov 2024 08:09    TPro  5.6[L]  /  Alb  2.7[L]  /  TBili  0.5  /  DBili  x   /  AST  33  /  ALT  96[H]  /  AlkPhos  62  11-21                          10.4   7.07  )-----------( 156      ( 21 Nov 2024 08:09 )             30.7       Urinalysis Basic - ( 21 Nov 2024 08:09 )    Color: x / Appearance: x / SG: x / pH: x  Gluc: 131 mg/dL / Ketone: x  / Bili: x / Urobili: x   Blood: x / Protein: x / Nitrite: x   Leuk Esterase: x / RBC: x / WBC x   Sq Epi: x / Non Sq Epi: x / Bacteria: x      LIVER FUNCTIONS - ( 21 Nov 2024 08:09 )  Alb: 2.7 g/dL / Pro: 5.6 g/dL / ALK PHOS: 62 U/L / ALT: 96 U/L / AST: 33 U/L / GGT: x             RECENT CULTURES:  11-18 @ 21:55 Clean Catch Clean Catch (Midstream)     <10,000 CFU/mL Normal Urogenital Venecia        11-18 @ 20:25          NotDetec  11-18 @ 20:05 .Blood BLOOD     No growth at 48 Hours        11-18 @ 19:55 .Blood BLOOD     No growth at 48 Hours              All imaging and data are reviewed.   
  Date of Service: 11-21-24 @ 10:17    Patient is a 59y old  Female who presents with a chief complaint of anaphylasis and sepsis (20 Nov 2024 13:49)       INTERVAL HPI/OVERNIGHT EVENTS: stable, feels much better today, less erythema and itching has subsided, no sob or cp    MEDICATIONS  (STANDING):  calamine/zinc oxide Lotion 1 Application(s) Topical every 8 hours  famotidine    Tablet 20 milliGRAM(s) Oral daily  methylPREDNISolone sodium succinate Injectable 40 milliGRAM(s) IV Push every 12 hours  sodium chloride 0.9%. 1000 milliLiter(s) (100 mL/Hr) IV Continuous <Continuous>    MEDICATIONS  (PRN):  acetaminophen     Tablet .. 650 milliGRAM(s) Oral every 6 hours PRN Moderate Pain (4 - 6)  diphenhydrAMINE 25 milliGRAM(s) Oral every 6 hours PRN Itching  ondansetron Injectable 4 milliGRAM(s) IV Push every 4 hours PRN Nausea and/or Vomiting      Allergies    Bactrim (Rash)  codeine (Vomiting; Nausea)    Intolerances        REVIEW OF SYSTEMS:  CONSTITUTIONAL: No fever, weight loss, or fatigue  EYES: No eye pain, visual disturbances  ENMT:  No difficulty hearing, tinnitus, vertigo; No sinus or throat pain  NECK: No pain or stiffness  RESPIRATORY: No cough, wheezing, chills or hemoptysis; No shortness of breath  CARDIOVASCULAR: No chest pain, palpitations, dizziness  GASTROINTESTINAL: No abdominal or epigastric pain. No nausea, vomiting, or hematemesis; No diarrhea or constipation. No melena or hematochezia.  GENITOURINARY: No dysuria, frequency, hematuria, or incontinence  NEUROLOGICAL: No headaches, memory loss, loss of strength, numbness, or tremors  SKIN: no itching today, and less diffuse erythema with less purpura  LYMPH NODES: No enlarged glands  MUSCULOSKELETAL: No joint pain or swelling; No muscle, back, or extremity pain  PSYCHIATRIC: No depression, mood swings  HEME/LYMPH: No easy bruising, or bleeding gums  ALLERGY AND IMMUNOLOGIC: No hives    Vital Signs Last 24 Hrs  T(C): 36.7 (21 Nov 2024 08:49), Max: 36.7 (20 Nov 2024 20:13)  T(F): 98 (21 Nov 2024 08:49), Max: 98 (20 Nov 2024 20:13)  HR: 56 (21 Nov 2024 08:49) (56 - 75)  BP: 108/67 (21 Nov 2024 08:49) (97/60 - 109/66)  BP(mean): --  RR: 18 (21 Nov 2024 08:49) (17 - 18)  SpO2: 92% (21 Nov 2024 08:49) (92% - 95%)    Parameters below as of 21 Nov 2024 08:49  Patient On (Oxygen Delivery Method): room air        PHYSICAL EXAM:  GENERAL: NAD,  HEAD:  Atraumatic, Normocephalic  EYES: EOMI, PERRLA, conjunctiva and sclera clear  ENMT: No tonsillar erythema, exudates, or enlargement   NECK: Supple, No JVD  NERVOUS SYSTEM:  Alert & Oriented X3, Good concentration  CHEST/LUNG: Clear to auscultation bilaterally; No rales, rhonchi, wheezing  HEART: Regular rate and rhythm  ABDOMEN: Soft, Nontender, Nondistended; Bowel sounds present  EXTREMITIES:  2+ Peripheral Pulses   LYMPH: No lymphadenopathy noted  SKIN:diffuse rash with less purpura and erythema, no blisters noted  LABS:                        10.4   7.07  )-----------( 156      ( 21 Nov 2024 08:09 )             30.7     21 Nov 2024 08:09    143    |  111    |  15     ----------------------------<  131    4.0     |  24     |  0.60     Ca    7.9        21 Nov 2024 08:09    TPro  5.6    /  Alb  2.7    /  TBili  0.5    /  DBili  x      /  AST  33     /  ALT  96     /  AlkPhos  62     21 Nov 2024 08:09      Urinalysis Basic - ( 21 Nov 2024 08:09 )    Color: x / Appearance: x / SG: x / pH: x  Gluc: 131 mg/dL / Ketone: x  / Bili: x / Urobili: x   Blood: x / Protein: x / Nitrite: x   Leuk Esterase: x / RBC: x / WBC x   Sq Epi: x / Non Sq Epi: x / Bacteria: x      CAPILLARY BLOOD GLUCOSE        blood culture --   <10,000 CFU/mL Normal Urogenital Venecia   11-18 @ 21:55    blood culture --   No growth at 48 Hours   11-18 @ 20:05    blood culture --   No growth at 48 Hours   11-18 @ 19:55      urine culture --  11-18 @ 21:55  results   <10,000 CFU/mL Normal Urogenital Venecia 11-18 @ 21:55  urine culture --  11-18 @ 20:05  results   No growth at 48 Hours 11-18 @ 20:05  urine culture --  11-18 @ 19:55  results   No growth at 48 Hours 11-18 @ 19:55    wound with gram statin --    11-18 @ 21:55  organism  --   11-18 @ 21:55  specimen source Clean Catch Clean Catch (Midstream)  11-18 @ 21:55  wound with gram statin --    11-18 @ 20:05  organism  --   11-18 @ 20:05  specimen source .Blood BLOOD  11-18 @ 20:05  wound with gram statin --    11-18 @ 19:55  organism  --   11-18 @ 19:55  specimen source .Blood BLOOD  11-18 @ 19:55      RADIOLOGY & ADDITIONAL TESTS:      Consultant(s) Notes Reviewed:  [x ] YES  [ ] NO    Care Discussed with Consultants/Other Providers [ x] YES  [ ] NO    Advanced care planning discussed with patient and family, advanced care planning forms reviewed, discussed, and completed.  20 minutes spent.  
  Date of Service: 11-19-24 @ 10:21    Patient is a 59y old  Female who presents with a chief complaint of anaphylasis and sepsis (18 Nov 2024 21:46)       INTERVAL HPI/OVERNIGHT EVENTS: pt stable, feels better, still with diffuse rash    MEDICATIONS  (STANDING):  cefTRIAXone   IVPB 1000 milliGRAM(s) IV Intermittent every 24 hours  famotidine    Tablet 20 milliGRAM(s) Oral daily  sodium chloride 0.9%. 1000 milliLiter(s) (100 mL/Hr) IV Continuous <Continuous>    MEDICATIONS  (PRN):  acetaminophen     Tablet .. 650 milliGRAM(s) Oral every 6 hours PRN Moderate Pain (4 - 6)  diphenhydrAMINE 25 milliGRAM(s) Oral every 6 hours PRN Rash and/or Itching  ondansetron Injectable 4 milliGRAM(s) IV Push every 4 hours PRN Nausea and/or Vomiting      Allergies    Bactrim (Rash)  codeine (Vomiting; Nausea)    Intolerances        REVIEW OF SYSTEMS:  CONSTITUTIONAL: No fever, weight loss, or fatigue  EYES: No eye pain, visual disturbances  ENMT:  No difficulty hearing, tinnitus, vertigo; No sinus or throat pain  NECK: No pain or stiffness  RESPIRATORY: No cough, wheezing, chills or hemoptysis; No shortness of breath  CARDIOVASCULAR: No chest pain, palpitations, dizziness  GASTROINTESTINAL:no diarrhea today  GENITOURINARY: No dysuria, frequency, hematuria, or incontinence  NEUROLOGICAL: No headaches, memory loss, loss of strength, numbness, or tremors  SKIN: itch and diffuse rash  LYMPH NODES: No enlarged glands  MUSCULOSKELETAL: No joint pain or swelling; No muscle, back, or extremity pain  PSYCHIATRIC: No depression, mood swings  HEME/LYMPH: No easy bruising, or bleeding gums  ALLERGY AND IMMUNOLOGIC: No hives    Vital Signs Last 24 Hrs  T(C): 37.8 (19 Nov 2024 09:42), Max: 38.1 (18 Nov 2024 19:30)  T(F): 100.1 (19 Nov 2024 09:42), Max: 100.5 (18 Nov 2024 19:30)  HR: 68 (19 Nov 2024 04:47) (68 - 111)  BP: 90/58 (19 Nov 2024 04:47) (87/59 - 104/57)  BP(mean): --  RR: 17 (19 Nov 2024 04:47) (17 - 18)  SpO2: 98% (19 Nov 2024 04:47) (96% - 100%)    Parameters below as of 19 Nov 2024 01:29  Patient On (Oxygen Delivery Method): room air        PHYSICAL EXAM:  GENERAL: NAD,  HEAD:  Atraumatic, Normocephalic  EYES: EOMI, PERRLA, conjunctiva and sclera clear  ENMT: No tonsillar erythema, exudates, or enlargement   NECK: Supple, No JVD  NERVOUS SYSTEM:  Alert & Oriented X3, Good concentration  CHEST/LUNG: Clear to auscultation bilaterally; No rales, rhonchi, wheezing  HEART: Regular rate and rhythm  ABDOMEN: Soft, Nontender, Nondistended; Bowel sounds present  EXTREMITIES:  2+ Peripheral Pulses   LYMPH: No lymphadenopathy noted  SKIN: diffuse rash on upper body, arms  LABS:                        11.2   4.49  )-----------( 90       ( 19 Nov 2024 06:50 )             33.7     19 Nov 2024 06:50    139    |  108    |  14     ----------------------------<  149    4.1     |  26     |  0.89     Ca    8.0        19 Nov 2024 06:50    TPro  5.8    /  Alb  2.7    /  TBili  0.3    /  DBili  x      /  AST  68     /  ALT  140    /  AlkPhos  71     19 Nov 2024 06:50    PT/INR - ( 18 Nov 2024 20:05 )   PT: 12.9 sec;   INR: 1.09 ratio         PTT - ( 18 Nov 2024 20:05 )  PTT:27.3 sec  Urinalysis Basic - ( 19 Nov 2024 06:50 )    Color: x / Appearance: x / SG: x / pH: x  Gluc: 149 mg/dL / Ketone: x  / Bili: x / Urobili: x   Blood: x / Protein: x / Nitrite: x   Leuk Esterase: x / RBC: x / WBC x   Sq Epi: x / Non Sq Epi: x / Bacteria: x      CAPILLARY BLOOD GLUCOSE                RADIOLOGY & ADDITIONAL TESTS:      Consultant(s) Notes Reviewed:  [ x] YES  [ ] NO    Care Discussed with Consultants/Other Providers [ x] YES  [ ] NO    Advanced care planning discussed with patient and family, advanced care planning forms reviewed, discussed, and completed.  20 minutes spent.

## 2024-11-21 NOTE — PROGRESS NOTE ADULT - PROBLEM SELECTOR PLAN 3
dc rocephin  cultures neg to date  trend labs and temp  clinically no dysuria
ruled out by neg cultures
ua noted  refractory to oral cipro and bactrim  iv rocephin  id eval  fu cultures

## 2024-11-21 NOTE — DISCHARGE NOTE NURSING/CASE MANAGEMENT/SOCIAL WORK - NSDCPEFALRISK_GEN_ALL_CORE
For information on Fall & Injury Prevention, visit: [email or web address] OR  [email or web address] OR  https://www.cdc.gov/steadi/patient.html

## 2024-11-21 NOTE — DISCHARGE NOTE PROVIDER - NSDCMRMEDTOKEN_GEN_ALL_CORE_FT
calamine topical lotion: 1 Apply topically to affected area every 8 hours  diphenhydrAMINE 25 mg oral capsule: 1 cap(s) orally every 6 hours As needed Itching  predniSONE 10 mg oral tablet: 4 tab(s) orally once a day

## 2024-11-24 LAB
CULTURE RESULTS: SIGNIFICANT CHANGE UP
CULTURE RESULTS: SIGNIFICANT CHANGE UP
SPECIMEN SOURCE: SIGNIFICANT CHANGE UP
SPECIMEN SOURCE: SIGNIFICANT CHANGE UP

## 2025-04-07 PROBLEM — G43.909 MIGRAINE, UNSPECIFIED, NOT INTRACTABLE, WITHOUT STATUS MIGRAINOSUS: Chronic | Status: ACTIVE | Noted: 2024-11-18

## 2025-04-23 ENCOUNTER — NON-APPOINTMENT (OUTPATIENT)
Age: 60
End: 2025-04-23

## 2025-04-23 ENCOUNTER — APPOINTMENT (OUTPATIENT)
Dept: GYNECOLOGIC ONCOLOGY | Facility: CLINIC | Age: 60
End: 2025-04-23

## 2025-04-23 VITALS
SYSTOLIC BLOOD PRESSURE: 104 MMHG | BODY MASS INDEX: 22.58 KG/M2 | HEART RATE: 64 BPM | RESPIRATION RATE: 16 BRPM | OXYGEN SATURATION: 95 % | WEIGHT: 112 LBS | HEIGHT: 59 IN | DIASTOLIC BLOOD PRESSURE: 70 MMHG

## 2025-04-23 DIAGNOSIS — C56.9 MALIGNANT NEOPLASM OF UNSPECIFIED OVARY: ICD-10-CM

## 2025-04-23 DIAGNOSIS — Z80.3 FAMILY HISTORY OF MALIGNANT NEOPLASM OF BREAST: ICD-10-CM

## 2025-04-23 PROCEDURE — 99204 OFFICE O/P NEW MOD 45 MIN: CPT

## 2025-04-23 PROCEDURE — 99459 PELVIC EXAMINATION: CPT

## 2025-04-23 RX ORDER — ESTRADIOL 0.1 MG/G
0.1 CREAM VAGINAL
Refills: 0 | Status: ACTIVE | COMMUNITY

## 2025-05-28 ENCOUNTER — NON-APPOINTMENT (OUTPATIENT)
Age: 60
End: 2025-05-28